# Patient Record
Sex: FEMALE | Race: WHITE | NOT HISPANIC OR LATINO | Employment: FULL TIME | URBAN - METROPOLITAN AREA
[De-identification: names, ages, dates, MRNs, and addresses within clinical notes are randomized per-mention and may not be internally consistent; named-entity substitution may affect disease eponyms.]

---

## 2017-01-18 ENCOUNTER — COMMUNICATION - HEALTHEAST (OUTPATIENT)
Dept: FAMILY MEDICINE | Facility: CLINIC | Age: 22
End: 2017-01-18

## 2017-04-03 ENCOUNTER — COMMUNICATION - HEALTHEAST (OUTPATIENT)
Dept: FAMILY MEDICINE | Facility: CLINIC | Age: 22
End: 2017-04-03

## 2017-06-30 ENCOUNTER — OFFICE VISIT - HEALTHEAST (OUTPATIENT)
Dept: FAMILY MEDICINE | Facility: CLINIC | Age: 22
End: 2017-06-30

## 2017-06-30 DIAGNOSIS — Z72.51 UNPROTECTED SEX: ICD-10-CM

## 2017-06-30 DIAGNOSIS — J45.20 INTERMITTENT ASTHMA: ICD-10-CM

## 2017-06-30 DIAGNOSIS — Z11.3 SCREEN FOR STD (SEXUALLY TRANSMITTED DISEASE): ICD-10-CM

## 2017-06-30 LAB — HIV 1+2 AB+HIV1 P24 AG SERPL QL IA: NEGATIVE

## 2017-07-01 LAB — HBV SURFACE AG SERPL QL IA: NEGATIVE

## 2017-07-03 ENCOUNTER — COMMUNICATION - HEALTHEAST (OUTPATIENT)
Dept: FAMILY MEDICINE | Facility: CLINIC | Age: 22
End: 2017-07-03

## 2017-07-03 LAB — SYPHILIS RPR SCREEN - HISTORICAL: NORMAL

## 2017-07-25 ENCOUNTER — RECORDS - HEALTHEAST (OUTPATIENT)
Dept: ADMINISTRATIVE | Facility: OTHER | Age: 22
End: 2017-07-25

## 2017-08-15 ENCOUNTER — COMMUNICATION - HEALTHEAST (OUTPATIENT)
Dept: TELEHEALTH | Facility: CLINIC | Age: 22
End: 2017-08-15

## 2017-08-15 ENCOUNTER — OFFICE VISIT - HEALTHEAST (OUTPATIENT)
Dept: FAMILY MEDICINE | Facility: CLINIC | Age: 22
End: 2017-08-15

## 2017-08-15 DIAGNOSIS — N89.8 VAGINAL DISCHARGE: ICD-10-CM

## 2017-08-15 ASSESSMENT — MIFFLIN-ST. JEOR: SCORE: 1447.34

## 2017-09-06 ENCOUNTER — OFFICE VISIT - HEALTHEAST (OUTPATIENT)
Dept: FAMILY MEDICINE | Facility: CLINIC | Age: 22
End: 2017-09-06

## 2017-09-06 DIAGNOSIS — R39.9 SYMPTOMS INVOLVING URINARY SYSTEM: ICD-10-CM

## 2017-09-06 DIAGNOSIS — N89.8 VAGINAL DISCHARGE: ICD-10-CM

## 2017-09-06 DIAGNOSIS — B37.31 VAGINAL CANDIDIASIS: ICD-10-CM

## 2017-09-06 ASSESSMENT — MIFFLIN-ST. JEOR: SCORE: 1424.66

## 2017-09-07 ENCOUNTER — COMMUNICATION - HEALTHEAST (OUTPATIENT)
Dept: FAMILY MEDICINE | Facility: CLINIC | Age: 22
End: 2017-09-07

## 2017-10-25 ENCOUNTER — COMMUNICATION - HEALTHEAST (OUTPATIENT)
Dept: FAMILY MEDICINE | Facility: CLINIC | Age: 22
End: 2017-10-25

## 2017-11-01 ENCOUNTER — OFFICE VISIT - HEALTHEAST (OUTPATIENT)
Dept: FAMILY MEDICINE | Facility: CLINIC | Age: 22
End: 2017-11-01

## 2017-11-01 DIAGNOSIS — N76.1 CHRONIC VAGINITIS: ICD-10-CM

## 2017-11-01 ASSESSMENT — MIFFLIN-ST. JEOR: SCORE: 1421.48

## 2017-11-08 ENCOUNTER — COMMUNICATION - HEALTHEAST (OUTPATIENT)
Dept: FAMILY MEDICINE | Facility: CLINIC | Age: 22
End: 2017-11-08

## 2017-11-10 ENCOUNTER — COMMUNICATION - HEALTHEAST (OUTPATIENT)
Dept: FAMILY MEDICINE | Facility: CLINIC | Age: 22
End: 2017-11-10

## 2017-11-21 ENCOUNTER — OFFICE VISIT - HEALTHEAST (OUTPATIENT)
Dept: FAMILY MEDICINE | Facility: CLINIC | Age: 22
End: 2017-11-21

## 2017-11-21 DIAGNOSIS — N76.1 CHRONIC VAGINITIS: ICD-10-CM

## 2017-11-21 ASSESSMENT — MIFFLIN-ST. JEOR: SCORE: 1427.15

## 2017-11-24 ENCOUNTER — COMMUNICATION - HEALTHEAST (OUTPATIENT)
Dept: FAMILY MEDICINE | Facility: CLINIC | Age: 22
End: 2017-11-24

## 2017-11-30 ENCOUNTER — RECORDS - HEALTHEAST (OUTPATIENT)
Dept: ADMINISTRATIVE | Facility: OTHER | Age: 22
End: 2017-11-30

## 2017-12-04 ENCOUNTER — RECORDS - HEALTHEAST (OUTPATIENT)
Dept: ADMINISTRATIVE | Facility: OTHER | Age: 22
End: 2017-12-04

## 2019-01-11 ENCOUNTER — COMMUNICATION - HEALTHEAST (OUTPATIENT)
Dept: FAMILY MEDICINE | Facility: CLINIC | Age: 24
End: 2019-01-11

## 2019-01-11 ENCOUNTER — OFFICE VISIT - HEALTHEAST (OUTPATIENT)
Dept: FAMILY MEDICINE | Facility: CLINIC | Age: 24
End: 2019-01-11

## 2019-01-11 ENCOUNTER — COMMUNICATION - HEALTHEAST (OUTPATIENT)
Dept: TELEHEALTH | Facility: CLINIC | Age: 24
End: 2019-01-11

## 2019-01-11 DIAGNOSIS — J45.20 MILD INTERMITTENT ASTHMA WITHOUT COMPLICATION: ICD-10-CM

## 2019-01-11 DIAGNOSIS — N89.8 VAGINAL DISCHARGE: ICD-10-CM

## 2019-01-11 DIAGNOSIS — F41.9 ANXIETY: ICD-10-CM

## 2019-01-11 DIAGNOSIS — R53.82 CHRONIC FATIGUE: ICD-10-CM

## 2019-01-11 DIAGNOSIS — Z30.41 ENCOUNTER FOR SURVEILLANCE OF CONTRACEPTIVE PILLS: ICD-10-CM

## 2019-01-11 DIAGNOSIS — Z00.00 HEALTH CARE MAINTENANCE: ICD-10-CM

## 2019-01-11 DIAGNOSIS — Z11.3 SCREEN FOR STD (SEXUALLY TRANSMITTED DISEASE): ICD-10-CM

## 2019-01-11 LAB
ALBUMIN SERPL-MCNC: 4 G/DL (ref 3.5–5)
ALP SERPL-CCNC: 50 U/L (ref 45–120)
ALT SERPL W P-5'-P-CCNC: 22 U/L (ref 0–45)
ANION GAP SERPL CALCULATED.3IONS-SCNC: 9 MMOL/L (ref 5–18)
AST SERPL W P-5'-P-CCNC: 27 U/L (ref 0–40)
BILIRUB SERPL-MCNC: 0.5 MG/DL (ref 0–1)
BUN SERPL-MCNC: 15 MG/DL (ref 8–22)
CALCIUM SERPL-MCNC: 9.5 MG/DL (ref 8.5–10.5)
CHLORIDE BLD-SCNC: 106 MMOL/L (ref 98–107)
CHOLEST SERPL-MCNC: 147 MG/DL
CLUE CELLS: NORMAL
CO2 SERPL-SCNC: 26 MMOL/L (ref 22–31)
CREAT SERPL-MCNC: 0.85 MG/DL (ref 0.6–1.1)
ERYTHROCYTE [DISTWIDTH] IN BLOOD BY AUTOMATED COUNT: 14.2 % (ref 11–14.5)
FASTING STATUS PATIENT QL REPORTED: NO
FERRITIN SERPL-MCNC: 6 NG/ML (ref 10–130)
GFR SERPL CREATININE-BSD FRML MDRD: >60 ML/MIN/1.73M2
GLUCOSE BLD-MCNC: 74 MG/DL (ref 70–125)
HCT VFR BLD AUTO: 37.8 % (ref 35–47)
HDLC SERPL-MCNC: 59 MG/DL
HGB BLD-MCNC: 12.8 G/DL (ref 12–16)
HIV 1+2 AB+HIV1 P24 AG SERPL QL IA: NEGATIVE
IRON SATN MFR SERPL: 18 % (ref 20–50)
IRON SERPL-MCNC: 96 UG/DL (ref 42–175)
LDLC SERPL CALC-MCNC: 77 MG/DL
MCH RBC QN AUTO: 29.2 PG (ref 27–34)
MCHC RBC AUTO-ENTMCNC: 33.8 G/DL (ref 32–36)
MCV RBC AUTO: 86 FL (ref 80–100)
PLATELET # BLD AUTO: 219 THOU/UL (ref 140–440)
PMV BLD AUTO: 7.4 FL (ref 7–10)
POTASSIUM BLD-SCNC: 4.1 MMOL/L (ref 3.5–5)
PROT SERPL-MCNC: 7 G/DL (ref 6–8)
RBC # BLD AUTO: 4.38 MILL/UL (ref 3.8–5.4)
SODIUM SERPL-SCNC: 141 MMOL/L (ref 136–145)
T4 FREE SERPL-MCNC: 1 NG/DL (ref 0.7–1.8)
TIBC SERPL-MCNC: 526 UG/DL (ref 313–563)
TRANSFERRIN SERPL-MCNC: 421 MG/DL (ref 212–360)
TRICHOMONAS, WET PREP: NORMAL
TRIGL SERPL-MCNC: 56 MG/DL
TSH SERPL DL<=0.005 MIU/L-ACNC: 1.05 UIU/ML (ref 0.3–5)
VIT B12 SERPL-MCNC: 637 PG/ML (ref 213–816)
WBC: 4.6 THOU/UL (ref 4–11)
YEAST, WET PREP: NORMAL

## 2019-01-11 ASSESSMENT — MIFFLIN-ST. JEOR: SCORE: 1397.27

## 2019-01-12 LAB
HBV SURFACE AG SERPL QL IA: NEGATIVE
T PALLIDUM AB SER QL: NEGATIVE

## 2019-01-14 LAB
25(OH)D3 SERPL-MCNC: 30.8 NG/ML (ref 30–80)
25(OH)D3 SERPL-MCNC: 30.8 NG/ML (ref 30–80)
C TRACH DNA SPEC QL PROBE+SIG AMP: NEGATIVE
N GONORRHOEA DNA SPEC QL NAA+PROBE: NEGATIVE

## 2019-02-03 ENCOUNTER — RECORDS - HEALTHEAST (OUTPATIENT)
Dept: ADMINISTRATIVE | Facility: OTHER | Age: 24
End: 2019-02-03

## 2019-03-19 ENCOUNTER — COMMUNICATION - HEALTHEAST (OUTPATIENT)
Dept: FAMILY MEDICINE | Facility: CLINIC | Age: 24
End: 2019-03-19

## 2019-03-19 DIAGNOSIS — F32.A DEPRESSION: ICD-10-CM

## 2020-01-13 ENCOUNTER — COMMUNICATION - HEALTHEAST (OUTPATIENT)
Dept: FAMILY MEDICINE | Facility: CLINIC | Age: 25
End: 2020-01-13

## 2020-01-13 DIAGNOSIS — Z30.41 ENCOUNTER FOR SURVEILLANCE OF CONTRACEPTIVE PILLS: ICD-10-CM

## 2020-07-24 ENCOUNTER — RECORDS - HEALTHEAST (OUTPATIENT)
Dept: ADMINISTRATIVE | Facility: OTHER | Age: 25
End: 2020-07-24

## 2020-08-04 ENCOUNTER — COMMUNICATION - HEALTHEAST (OUTPATIENT)
Dept: FAMILY MEDICINE | Facility: CLINIC | Age: 25
End: 2020-08-04

## 2020-08-04 DIAGNOSIS — Z30.41 ENCOUNTER FOR SURVEILLANCE OF CONTRACEPTIVE PILLS: ICD-10-CM

## 2020-09-08 ENCOUNTER — RECORDS - HEALTHEAST (OUTPATIENT)
Dept: ADMINISTRATIVE | Facility: OTHER | Age: 25
End: 2020-09-08

## 2020-10-19 ENCOUNTER — COMMUNICATION - HEALTHEAST (OUTPATIENT)
Dept: FAMILY MEDICINE | Facility: CLINIC | Age: 25
End: 2020-10-19

## 2020-10-19 DIAGNOSIS — Z30.41 ENCOUNTER FOR SURVEILLANCE OF CONTRACEPTIVE PILLS: ICD-10-CM

## 2020-10-21 ENCOUNTER — OFFICE VISIT - HEALTHEAST (OUTPATIENT)
Dept: FAMILY MEDICINE | Facility: CLINIC | Age: 25
End: 2020-10-21

## 2020-10-21 DIAGNOSIS — J20.9 ACUTE BRONCHITIS, UNSPECIFIED ORGANISM: ICD-10-CM

## 2020-10-21 DIAGNOSIS — R06.2 WHEEZING: ICD-10-CM

## 2020-10-21 DIAGNOSIS — R05.9 COUGH: ICD-10-CM

## 2020-10-21 RX ORDER — ALBUTEROL SULFATE 90 UG/1
2 AEROSOL, METERED RESPIRATORY (INHALATION) EVERY 4 HOURS PRN
Qty: 1 INHALER | Refills: 3 | Status: SHIPPED | OUTPATIENT
Start: 2020-10-21 | End: 2022-10-23

## 2020-11-13 ENCOUNTER — COMMUNICATION - HEALTHEAST (OUTPATIENT)
Dept: FAMILY MEDICINE | Facility: CLINIC | Age: 25
End: 2020-11-13

## 2020-11-13 DIAGNOSIS — Z30.41 ENCOUNTER FOR SURVEILLANCE OF CONTRACEPTIVE PILLS: ICD-10-CM

## 2020-11-16 RX ORDER — NORETHINDRONE ACETATE AND ETHINYL ESTRADIOL .02; 1 MG/1; MG/1
TABLET ORAL
Qty: 84 TABLET | Refills: 3 | Status: SHIPPED | OUTPATIENT
Start: 2020-11-16 | End: 2022-02-16

## 2021-03-04 ENCOUNTER — AMBULATORY - HEALTHEAST (OUTPATIENT)
Dept: FAMILY MEDICINE | Facility: CLINIC | Age: 26
End: 2021-03-04

## 2021-03-04 ENCOUNTER — OFFICE VISIT - HEALTHEAST (OUTPATIENT)
Dept: FAMILY MEDICINE | Facility: CLINIC | Age: 26
End: 2021-03-04

## 2021-03-04 DIAGNOSIS — N39.0 URINARY TRACT INFECTION: ICD-10-CM

## 2021-03-04 DIAGNOSIS — N76.0 VAGINOSIS: ICD-10-CM

## 2021-03-04 DIAGNOSIS — J45.20 MILD INTERMITTENT ASTHMA WITHOUT COMPLICATION: ICD-10-CM

## 2021-03-04 DIAGNOSIS — Z00.00 ROUTINE GENERAL MEDICAL EXAMINATION AT A HEALTH CARE FACILITY: ICD-10-CM

## 2021-03-04 LAB
ALBUMIN UR-MCNC: NEGATIVE MG/DL
APPEARANCE UR: CLEAR
BACTERIA #/AREA URNS HPF: ABNORMAL HPF
BILIRUB UR QL STRIP: NEGATIVE
CHOLEST SERPL-MCNC: 179 MG/DL
CLUE CELLS: ABNORMAL
COLOR UR AUTO: YELLOW
FASTING STATUS PATIENT QL REPORTED: NO
FASTING STATUS PATIENT QL REPORTED: NO
GLUCOSE BLD-MCNC: 82 MG/DL (ref 70–125)
GLUCOSE UR STRIP-MCNC: NEGATIVE MG/DL
HDLC SERPL-MCNC: 64 MG/DL
HGB BLD-MCNC: 13.1 G/DL (ref 12–16)
HGB UR QL STRIP: NEGATIVE
KETONES UR STRIP-MCNC: NEGATIVE MG/DL
LDLC SERPL CALC-MCNC: 105 MG/DL
LEUKOCYTE ESTERASE UR QL STRIP: ABNORMAL
NITRATE UR QL: NEGATIVE
PH UR STRIP: 7 [PH] (ref 5–8)
RBC #/AREA URNS AUTO: ABNORMAL HPF
SP GR UR STRIP: 1.01 (ref 1–1.03)
SQUAMOUS #/AREA URNS AUTO: ABNORMAL LPF
TRICHOMONAS, WET PREP: ABNORMAL
TRIGL SERPL-MCNC: 49 MG/DL
UROBILINOGEN UR STRIP-ACNC: ABNORMAL
WBC #/AREA URNS AUTO: ABNORMAL HPF
YEAST, WET PREP: ABNORMAL

## 2021-03-04 ASSESSMENT — MIFFLIN-ST. JEOR: SCORE: 1467.21

## 2021-03-05 LAB — BACTERIA SPEC CULT: NO GROWTH

## 2021-05-26 VITALS
HEART RATE: 90 BPM | DIASTOLIC BLOOD PRESSURE: 72 MMHG | OXYGEN SATURATION: 97 % | TEMPERATURE: 98.8 F | SYSTOLIC BLOOD PRESSURE: 118 MMHG

## 2021-05-28 ASSESSMENT — ASTHMA QUESTIONNAIRES: ACT_TOTALSCORE: 23

## 2021-05-29 ENCOUNTER — RECORDS - HEALTHEAST (OUTPATIENT)
Dept: ADMINISTRATIVE | Facility: CLINIC | Age: 26
End: 2021-05-29

## 2021-05-31 VITALS — WEIGHT: 143.1 LBS | BODY MASS INDEX: 21.69 KG/M2 | HEIGHT: 68 IN

## 2021-05-31 VITALS — BODY MASS INDEX: 21.58 KG/M2 | WEIGHT: 142.4 LBS | HEIGHT: 68 IN

## 2021-05-31 VITALS — WEIGHT: 141.9 LBS | BODY MASS INDEX: 21.5 KG/M2 | HEIGHT: 68 IN

## 2021-05-31 VITALS — HEIGHT: 68 IN | BODY MASS INDEX: 22.45 KG/M2 | WEIGHT: 148.1 LBS

## 2021-05-31 VITALS — BODY MASS INDEX: 22.9 KG/M2 | WEIGHT: 148.38 LBS

## 2021-06-02 VITALS — BODY MASS INDEX: 20.77 KG/M2 | WEIGHT: 137.06 LBS | HEIGHT: 68 IN

## 2021-06-03 ENCOUNTER — RECORDS - HEALTHEAST (OUTPATIENT)
Dept: ADMINISTRATIVE | Facility: CLINIC | Age: 26
End: 2021-06-03

## 2021-06-05 VITALS
WEIGHT: 153.1 LBS | SYSTOLIC BLOOD PRESSURE: 126 MMHG | DIASTOLIC BLOOD PRESSURE: 72 MMHG | HEART RATE: 90 BPM | BODY MASS INDEX: 24.03 KG/M2 | HEIGHT: 67 IN

## 2021-06-05 NOTE — TELEPHONE ENCOUNTER
Refill Given    Refill given per Policy, patient informed they are overdue for Labs and Physical     Anna Whelan, [facility] Connection Triage/Med Refill 1/14/2020    Requested Prescriptions   Pending Prescriptions Disp Refills     norethindrone-ethinyl estradiol (JUNEL 1/20, 21,) 1-20 mg-mcg per tablet [Pharmacy Med Name: JUNEL 1 MG-20 MCG TABLET] 84 tablet 3     Sig: TAKE 1 TABLET BY MOUTH EVERY DAY       Oral Contraceptives Protocol Failed - 1/13/2020  2:15 AM        Failed - Visit with PCP or prescribing provider visit in last 12 months      Last office visit with prescriber/PCP: 9/6/2017 Leigh Ann Monterroso MD OR same dept: Visit date not found OR same specialty: 11/21/2017 Gissel Desouza DO  Last physical: 1/11/2019 Last MTM visit: Visit date not found   Next visit within 3 mo: Visit date not found  Next physical within 3 mo: Visit date not found  Prescriber OR PCP: Leigh Ann Monterroso MD  Last diagnosis associated with med order: 1. Encounter for surveillance of contraceptive pills  - JUNEL 1/20, 21, 1-20 mg-mcg per tablet [Pharmacy Med Name: JUNEL 1 MG-20 MCG TABLET]; TAKE 1 TABLET BY MOUTH EVERY DAY  Dispense: 84 tablet; Refill: 3    If protocol passes may refill for 12 months if within 3 months of last provider visit (or a total of 15 months).

## 2021-06-08 ENCOUNTER — RECORDS - HEALTHEAST (OUTPATIENT)
Dept: ADMINISTRATIVE | Facility: OTHER | Age: 26
End: 2021-06-08

## 2021-06-10 NOTE — TELEPHONE ENCOUNTER
RN cannot approve Refill Request    RN can NOT refill this medication Protocol failed and NO refill given. Last office visit: 9/6/2017 Leigh Ann Monterroso MD Last Physical: 1/11/2019 Last MTM visit: Visit date not found Last visit same specialty: 11/21/2017 Gissel Desouza DO.  Next visit within 3 mo: Visit date not found  Next physical within 3 mo: Visit date not found      Neeta Bland, [facility] [place] Connection Triage/Med Refill 8/4/2020    Requested Prescriptions   Pending Prescriptions Disp Refills     norethindrone-ethinyl estradiol (JUNEL 1/20, 21,) 1-20 mg-mcg per tablet 84 tablet 0     Sig: TAKE 1 TABLET BY MOUTH EVERY DAY       Oral Contraceptives Protocol Failed - 8/4/2020  9:11 AM        Failed - Visit with PCP or prescribing provider visit in last 12 months      Last office visit with prescriber/PCP: 9/6/2017 Leigh Ann Monterroso MD OR same dept: Visit date not found OR same specialty: 11/21/2017 Gissel Desouza DO  Last physical: 1/11/2019 Last MTM visit: Visit date not found   Next visit within 3 mo: Visit date not found  Next physical within 3 mo: Visit date not found  Prescriber OR PCP: Leigh Ann Monterroso MD  Last diagnosis associated with med order: 1. Encounter for surveillance of contraceptive pills  - norethindrone-ethinyl estradiol (JUNEL 1/20, 21,) 1-20 mg-mcg per tablet; TAKE 1 TABLET BY MOUTH EVERY DAY  Dispense: 84 tablet; Refill: 0    If protocol passes may refill for 12 months if within 3 months of last provider visit (or a total of 15 months).

## 2021-06-10 NOTE — TELEPHONE ENCOUNTER
Refill Request  Did you contact pharmacy: Yes  Patient was told to call provider.  Totally out of tablets. Patient had a physical in January. Please review.       Medication name:   Requested Prescriptions     Pending Prescriptions Disp Refills     norethindrone-ethinyl estradiol (JUNEL 1/20, 21,) 1-20 mg-mcg per tablet 84 tablet 0     Sig: TAKE 1 TABLET BY MOUTH EVERY DAY     Who prescribed the medication: Dr Monterroso  Requested Pharmacy: [facility] [id number] Please note change.   Is patient out of medication: Yes  Patient notified refills processed in 3 business days:  no  Okay to leave a detailed message: no

## 2021-06-11 NOTE — PROGRESS NOTES
Assessment/Plan:     1. Screen for STD (sexually transmitted disease)  Chlamydia trachomatis & Neisseria gonorrhoeae, Amplified Detection    HIV Antigen/Antibody Screening [place]    Syphilis Screen, [place]    Hepatitis B Surface Antigen (HBsAG)   2. Unprotected sex  Pregnancy (Beta-hCG, Qual), Urine   3. Intermittent asthma         Diagnoses and all orders for this visit:    Screen for STD (sexually transmitted disease)  -     Chlamydia trachomatis & Neisseria gonorrhoeae, Amplified Detection  -     HIV Antigen/Antibody Screening [place]  -     Syphilis Screen, [place]  -     Hepatitis B Surface Antigen (HBsAG)    Unprotected sex  -     Pregnancy (Beta-hCG, Qual), Urine  -Counseled on safe sex practices and use of condoms    Intermittent asthma  -Stable.  Continue albuterol as needed and before exercise.  Asthma action plan completed.    Other orders  -     albuterol (PROAIR HFA) 90 mcg/actuation inhaler; Inhale 2 puffs every 4 (four) hours as needed for wheezing. 15 minutes before exercise and/or every 4 hours as needed.  Dispense: 1 Inhaler; Refill: 3                 Subjective:      Chelo Faulkner is a 22 y.o. female who comes in today for STD check.  She recently had unprotected intercourse.  She has not been exposed to any known sexually transmitted infections but would like to be proactive and be tested.  She is not experiencing any concerning symptoms.  Denies any abnormal vaginal discharge or abnormal bleeding.  No pelvic pain or pain with intercourse.  She is using birth control pills for contraception.  Last menstrual period started 6/12/2017.  She has intermittent asthma and requests refill of albuterol inhaler.  Uses this before exercise.  Overall feels her asthma is under good control.  Reviewed allergies and medications.  No other concerns or questions today.  Remainder of review of systems negative.    Current Outpatient Prescriptions   Medication Sig Dispense Refill     albuterol (PROAIR HFA)  90 mcg/actuation inhaler Inhale 2 puffs every 4 (four) hours as needed for wheezing. 15 minutes before exercise and/or every 4 hours as needed. 1 Inhaler 3     norethindrone-ethinyl estradiol (JUNEL 1/20, 21,) 1-20 mg-mcg per tablet Take 1 tablet by mouth daily. 84 tablet 2     desonide (DESOWEN) 0.05 % ointment Apply sparingly to affected area(s) twice daily.       No current facility-administered medications for this visit.        Past Medical History, Family History, and Social History reviewed.  Past Medical History:   Diagnosis Date     Scoliosis      No past surgical history on file.  Review of patient's allergies indicates no known allergies.  Family History   Problem Relation Age of Onset     No Medical Problems Mother      Stroke Father      No Medical Problems Brother      Social History     Social History     Marital status: Single     Spouse name: N/A     Number of children: N/A     Years of education: N/A     Occupational History     Not on file.     Social History Main Topics     Smoking status: Never Smoker     Smokeless tobacco: Not on file     Alcohol use No      Comment: occ social drink     Drug use: No     Sexual activity: No     Other Topics Concern     Not on file     Social History Narrative         Review of systems is as stated in HPI, and the remainder of the 10 system review is otherwise negative.    Objective:     Vitals:    06/30/17 0935   BP: 110/62   Patient Site: Right Arm   Patient Position: Sitting   Cuff Size: Adult Regular   Pulse: 72   Temp: 98.6  F (37  C)   TempSrc: Tympanic   SpO2: 100%   Weight: 148 lb 6 oz (67.3 kg)    Body mass index is 22.9 kg/(m^2).    General appearance: alert, appears stated age and cooperative  Pelvic: cervix normal in appearance, external genitalia normal and vagina normal without discharge      ACT: 25  Recent Results (from the past 240 hour(s))   Pregnancy (Beta-hCG, Qual), Urine   Result Value Ref Range    Pregnancy Test, Urine Negative Negative     Specific Gravity, UA 1.015 1.001 - 1.030       This note has been dictated using voice recognition software. Any grammatical or context distortions are unintentional and inherent to the the software.

## 2021-06-12 NOTE — TELEPHONE ENCOUNTER
Refill sent today. Pt due for office visit before any further refills can be given. Please call to schedule physical

## 2021-06-12 NOTE — PROGRESS NOTES
Assessment/Plan:     1. Vaginal candidiasis     2. Symptoms involving urinary system  Urinalysis-UC if Indicated    Culture, Urine   3. Vaginal discharge  Wet Prep, Vaginal    Chlamydia trachomatis & Neisseria gonorrhoeae, Amplified Detection       Diagnoses and all orders for this visit:    Vaginal candidiasis  -Wet prep shows presence of yeast.  We will treat with fluconazole 150 mg once and repeat in 3-5 days if needed.  Counseled on use of medication and side effects.  Follow-up if symptoms worsening or not improving with treatment.    Symptoms involving urinary system  -     Urinalysis-UC if Indicated  -     Culture, Urine  -Urinalysis is slightly abnormal but looks to be contaminated with presence of multiple squamous cells.  Not having typical UTI symptoms.  Will send urine for culture.  Will treat if culture is positive.    Vaginal discharge  -     Wet Prep, Vaginal  -     Chlamydia trachomatis & Neisseria gonorrhoeae, Amplified Detection  -Wet prep positive for yeast.  Will treat with fluconazole.    Other orders  -     fluconazole (DIFLUCAN) 150 MG tablet; Take 1 tablet (150 mg total) by mouth once for 1 dose. Repeat in 3-5 days if needed  Dispense: 2 tablet; Refill: 0             Subjective:      Chelo Faulkner is a 22 y.o. female who comes in today with concern about a vaginal discharge.  She was seen on August 15 for the same concern.  At that time a wet prep was negative.  Gonorrhea and chlamydia swab was not collected.  She was advised on some to Medicare's and advised follow-up with her primary care provider if symptoms persisted.  She reports that she did develop some vaginal itching a few days after that visit and then she had her menstrual period.  After her period ended she had recurrence of symptoms.  Having increased vaginal discharge that is thicker than usual.  Also slightly yellowish in color with a slight odor.  Has had vaginal itching and some pain with penetration during intercourse.   She has not had dysuria.  Will occasionally have sudden urgency to use the bathroom with little urine output.  Otherwise no regular symptoms of urinary frequency or urgency.  She has not had any new sexual partners.  No exposure to sexually transmitted infections that she is aware of.  She has no other concerns today.  Review of systems otherwise negative.  She reports that she did take an antibiotic in early August for treatment of a sinus infection.  Allergies medications are reviewed and updated in the chart.    Current Outpatient Prescriptions   Medication Sig Dispense Refill     albuterol (PROAIR HFA) 90 mcg/actuation inhaler Inhale 2 puffs every 4 (four) hours as needed for wheezing. 15 minutes before exercise and/or every 4 hours as needed. 1 Inhaler 3     desonide (DESOWEN) 0.05 % ointment Apply sparingly to affected area(s) twice daily.       norethindrone-ethinyl estradiol (JUNEL 1/20, 21,) 1-20 mg-mcg per tablet Take 1 tablet by mouth daily. 84 tablet 2     fluconazole (DIFLUCAN) 150 MG tablet Take 1 tablet (150 mg total) by mouth once for 1 dose. Repeat in 3-5 days if needed 2 tablet 0     No current facility-administered medications for this visit.        Past Medical History, Family History, and Social History reviewed.  Past Medical History:   Diagnosis Date     Scoliosis      No past surgical history on file.  Review of patient's allergies indicates no known allergies.  Family History   Problem Relation Age of Onset     No Medical Problems Mother      Stroke Father      No Medical Problems Brother      Social History     Social History     Marital status: Single     Spouse name: N/A     Number of children: N/A     Years of education: N/A     Occupational History     Not on file.     Social History Main Topics     Smoking status: Never Smoker     Smokeless tobacco: Not on file     Alcohol use No      Comment: occ social drink     Drug use: No     Sexual activity: No     Other Topics Concern     Not  "on file     Social History Narrative         Review of systems is as stated in HPI, and the remainder of the 10 system review is otherwise negative.    Objective:     Vitals:    09/06/17 0840   BP: 110/60   Patient Site: Right Arm   Patient Position: Sitting   Cuff Size: Adult Regular   Pulse: 62   Resp: 20   Temp: 98.3  F (36.8  C)   TempSrc: Oral   Weight: 143 lb 1.6 oz (64.9 kg)   Height: 5' 7.5\" (1.715 m)    Body mass index is 22.08 kg/(m^2).    General appearance: alert, appears stated age and cooperative  Pelvic: cervix normal in appearance, external genitalia normal, rectovaginal septum normal and moderate amount of thick white vaginal discharge present  Neurologic: Grossly normal    Recent Results (from the past 168 hour(s))   Urinalysis-UC if Indicated   Result Value Ref Range    Color, UA Yellow Colorless, Yellow, Straw, Light Yellow    Clarity, UA Cloudy (!) Clear    Glucose, UA Negative Negative    Bilirubin, UA Negative Negative    Ketones, UA Negative Negative    Specific Gravity, UA 1.020 1.005 - 1.030    Blood, UA Trace (!) Negative    pH, UA 6.5 5.0 - 8.0    Protein, UA Trace (!) Negative mg/dL    Urobilinogen, UA 0.2 E.U./dL 0.2 E.U./dL, 1.0 E.U./dL    Nitrite, UA Negative Negative    Leukocytes, UA Moderate (!) Negative    Bacteria, UA Many (!) None Seen hpf    RBC, UA 0-2 None Seen, 0-2 hpf    WBC, UA 5-10 (!) None Seen, 0-5 hpf    Squam Epithel, UA 10-25 (!) None Seen, 0-5 lpf    Mucus, UA Few (!) None Seen lpf   Wet Prep, Vaginal   Result Value Ref Range    Yeast Result Yeast Seen (!) No yeast seen    Trichomonas No Trichomonas seen No Trichomonas seen    Clue Cells, Wet Prep No Clue cells seen No Clue cells seen       This note has been dictated using voice recognition software. Any grammatical or context distortions are unintentional and inherent to the the software.       "

## 2021-06-12 NOTE — PROGRESS NOTES
Assessment/Plan:     1. Acute bronchitis, unspecified organism  azithromycin (ZITHROMAX) 250 MG tablet    benzonatate (TESSALON PERLES) 100 MG capsule   2. Cough  XR Chest 2 Views   3. Wheezing  XR Chest 2 Views    albuterol (PROAIR HFA) 90 mcg/actuation inhaler       Diagnoses and all orders for this visit:    Acute bronchitis, unspecified organism  -     azithromycin (ZITHROMAX) 250 MG tablet; Take 2 tabs on day 1 and then 1 tab daily for next 4 days  Dispense: 6 tablet; Refill: 0  -     benzonatate (TESSALON PERLES) 100 MG capsule; Take 1 capsule (100 mg total) by mouth 3 (three) times a day as needed for cough.  Dispense: 30 capsule; Refill: 0    Cough  -     XR Chest 2 Views    Wheezing  -     XR Chest 2 Views  -     albuterol (PROAIR HFA) 90 mcg/actuation inhaler; Inhale 2 puffs every 4 (four) hours as needed for wheezing. 15 minutes before exercise and/or every 4 hours as needed.  Dispense: 1 Inhaler; Refill: 3       Chest x-ray is performed in clinic and is negative.  Lung examination today is normal.  Symptoms consistent with acute bronchitis.  Given duration of cough, recommend treatment with a azithromycin.  Prescription sent to pharmacy.  Counseled her on use of medication side effects.  Okay to use albuterol inhaler as needed.  Also provided prescription for benzonatate capsules to use for cough.  Encourage adequate rest and fluids.  Follow-up if symptoms worsening, do not improve or if new concerning symptoms develop.      Subjective:      Chelo Faulkner is a 25 y.o. female who comes in today with concern about cough and wheezing.  She reports that on Labor Day weekend she was seen at the [facility] for a sinus infection.  Covid test was performed and it was positive.  She was treated with amoxicillin for the sinus infection.  Symptoms resolved and she felt better.  Then earlier this month she was seen at the [facility] again for sore throat and swollen glands in her neck.  Rapid strep was  negative.  She was advised on symptomatic cares.  She reports that the throat pain has resolved.  However she is now bothered by a lingering cough that has been present for the last several weeks.  Cough is associated with congestion in her chest.  Cough is productive of mucus and phlegm.  Sometimes mucus is greenish in color.  She is otherwise feeling fine.  She has not had fevers or chills.  No sinus pain or pressure.  No headaches or body aches.  No significant shortness of breath.  Does have occasional wheezing.  She has tried over-the-counter cough and cold medication without significant relief.  She has not been using her albuterol inhaler because she is not sure if it is okay to do so.  Medications and allergies are reviewed and updated.  No other concerns or questions.    Current Outpatient Medications   Medication Sig Dispense Refill     albuterol (PROAIR HFA) 90 mcg/actuation inhaler Inhale 2 puffs every 4 (four) hours as needed for wheezing. 15 minutes before exercise and/or every 4 hours as needed. 1 Inhaler 3     norethindrone-ethinyl estradiol (JUNEL 1/20, 21,) 1-20 mg-mcg per tablet TAKE 1 TABLET BY MOUTH EVERY DAY 84 tablet 0     azithromycin (ZITHROMAX) 250 MG tablet Take 2 tabs on day 1 and then 1 tab daily for next 4 days 6 tablet 0     benzonatate (TESSALON PERLES) 100 MG capsule Take 1 capsule (100 mg total) by mouth 3 (three) times a day as needed for cough. 30 capsule 0     No current facility-administered medications for this visit.        Past Medical History, Family History, and Social History reviewed.  Past Medical History:   Diagnosis Date     Scoliosis      No past surgical history on file.  Patient has no known allergies.  Family History   Problem Relation Age of Onset     No Medical Problems Mother      Stroke Father      No Medical Problems Brother      Social History     Socioeconomic History     Marital status: Single     Spouse name: Not on file     Number of children: Not on file      Years of education: Not on file     Highest education level: Not on file   Occupational History     Not on file   Social Needs     Financial resource strain: Not on file     Food insecurity     Worry: Not on file     Inability: Not on file     Transportation needs     Medical: Not on file     Non-medical: Not on file   Tobacco Use     Smoking status: Never Smoker     Smokeless tobacco: Never Used   Substance and Sexual Activity     Alcohol use: No     Comment: occ social drink     Drug use: No     Sexual activity: Never     Birth control/protection: Pill   Lifestyle     Physical activity     Days per week: Not on file     Minutes per session: Not on file     Stress: Not on file   Relationships     Social connections     Talks on phone: Not on file     Gets together: Not on file     Attends Mosque service: Not on file     Active member of club or organization: Not on file     Attends meetings of clubs or organizations: Not on file     Relationship status: Not on file     Intimate partner violence     Fear of current or ex partner: Not on file     Emotionally abused: Not on file     Physically abused: Not on file     Forced sexual activity: Not on file   Other Topics Concern     Not on file   Social History Narrative     Not on file         Review of systems is as stated in HPI, and the remainder of the 10 system review is otherwise negative.    Objective:     There were no vitals filed for this visit. There is no height or weight on file to calculate BMI.    General appearance: alert, appears stated age and cooperative  Head: Normocephalic, without obvious abnormality, atraumatic  Eyes: negative  Ears: normal TM's and external ear canals both ears  Nose: Nares normal. Septum midline. Mucosa normal. No drainage or sinus tenderness.  Throat: lips, mucosa, and tongue normal; teeth and gums normal  Lungs: clear to auscultation bilaterally  Heart: regular rate and rhythm, S1, S2 normal, no murmur, click, rub or  gallop    Results: Chest x-ray was performed today in clinic.  This was personally reviewed.  Per my read it was negative.      This note has been dictated using voice recognition software. Any grammatical or context distortions are unintentional and inherent to the the software.

## 2021-06-12 NOTE — TELEPHONE ENCOUNTER
RN cannot approve Refill Request    RN can NOT refill this medication PCP messaged that patient is overdue for Office Visit and Protocol failed and NO refill given. Last office visit: 9/6/2017 Leigh Ann Monterroso MD Last Physical: 1/11/2019 Last MTM visit: Visit date not found Last visit same specialty: 11/21/2017 Gissel Desouza DO.  Next visit within 3 mo: Visit date not found  Next physical within 3 mo: Visit date not found      Ana Hyde, [facility] Triage/Med Refill 10/20/2020    Requested Prescriptions   Pending Prescriptions Disp Refills     norethindrone-ethinyl estradiol (JUNEL 1/20, 21,) 1-20 mg-mcg per tablet [Pharmacy Med Name: JUNEL 1 MG-20 MCG TABLET] 84 tablet 0     Sig: TAKE 1 TABLET BY MOUTH EVERY DAY       Oral Contraceptives Protocol Failed - 10/19/2020 12:29 AM        Failed - Visit with PCP or prescribing provider visit in last 12 months      Last office visit with prescriber/PCP: 9/6/2017 Leigh Ann Monterroso MD OR same dept: Visit date not found OR same specialty: 11/21/2017 Gissel Desouza DO  Last physical: 1/11/2019 Last MTM visit: Visit date not found   Next visit within 3 mo: Visit date not found  Next physical within 3 mo: Visit date not found  Prescriber OR PCP: Leigh Ann Monterroso MD  Last diagnosis associated with med order: 1. Encounter for surveillance of contraceptive pills  - JUNEL 1/20, 21, 1-20 mg-mcg per tablet [Pharmacy Med Name: JUNEL 1 MG-20 MCG TABLET]; TAKE 1 TABLET BY MOUTH EVERY DAY  Dispense: 84 tablet; Refill: 0    If protocol passes may refill for 12 months if within 3 months of last provider visit (or a total of 15 months).

## 2021-06-12 NOTE — PROGRESS NOTES
Assessment:   1. Vaginal discharge  Normal drainage.  Wet prep was negative.  - Wet Prep, Vaginal     Plan:   Symptomatic local care discussed.  Follow up with PCP in 5 days if symptom persist or worsen    Subjective:   Chelo Faulkner is a 22 y.o. female who presents for evaluation of vaginal discharge yellow, green, thick and odorless. Symptoms have been present for 4 days. Symptoms include none. Menstrual pattern: bleeding regularly. Contraception: OCP (estrogen/progesterone)  STI Risk: Very low risk of STD exposure   The following portions of the patient's history were reviewed and updated as appropriate:   She  has a past medical history of Scoliosis.  She  does not have any pertinent problems on file.  She  has no past surgical history on file.  Her family history includes No Medical Problems in her brother and mother; Stroke in her father.  She  reports that she has never smoked. She does not have any smokeless tobacco history on file. She reports that she does not drink alcohol or use illicit drugs.  Current Outpatient Prescriptions   Medication Sig Dispense Refill     albuterol (PROAIR HFA) 90 mcg/actuation inhaler Inhale 2 puffs every 4 (four) hours as needed for wheezing. 15 minutes before exercise and/or every 4 hours as needed. 1 Inhaler 3     norethindrone-ethinyl estradiol (JUNEL 1/20, 21,) 1-20 mg-mcg per tablet Take 1 tablet by mouth daily. 84 tablet 2     desonide (DESOWEN) 0.05 % ointment Apply sparingly to affected area(s) twice daily.       No current facility-administered medications for this visit.      Current Outpatient Prescriptions on File Prior to Visit   Medication Sig     albuterol (PROAIR HFA) 90 mcg/actuation inhaler Inhale 2 puffs every 4 (four) hours as needed for wheezing. 15 minutes before exercise and/or every 4 hours as needed.     norethindrone-ethinyl estradiol (JUNEL 1/20, 21,) 1-20 mg-mcg per tablet Take 1 tablet by mouth daily.     desonide (DESOWEN) 0.05 % ointment Apply  "sparingly to affected area(s) twice daily.     No current facility-administered medications on file prior to visit.      She has No Known Allergies..    Review of Systems  A 12 point comprehensive review of systems was negative except as noted.      Objective:   /62  Pulse 61  Ht 5' 7.5\" (1.715 m)  Wt 148 lb 1.6 oz (67.2 kg)  LMP 07/30/2017  SpO2 96%  BMI 22.85 kg/m2  General appearance: alert, appears stated age and cooperative  Head: Normocephalic, without obvious abnormality, atraumatic  Pelvic: cervix normal in appearance, external genitalia normal, no adnexal masses or tenderness, no bladder tenderness, no cervical motion tenderness, positive findings: vaginal discharge:  white and thick and rectovaginal septum normal  Pulses: 2+ and symmetric  Skin: Skin color, texture, turgor normal. No rashes or lesions  Lymph nodes: Cervical, supraclavicular, and axillary nodes normal.  Neurologic: Grossly normal   "

## 2021-06-13 NOTE — TELEPHONE ENCOUNTER
Refill Approved    Rx renewed per Medication Renewal Policy. Medication was last renewed on 10/21/20.    Neeta Bland, [facility] Triage/Med Refill 11/16/2020     Requested Prescriptions   Pending Prescriptions Disp Refills     norethindrone-ethinyl estradiol (JUNEL 1/20, 21,) 1-20 mg-mcg per tablet 84 tablet 0     Sig: TAKE 1 TABLET BY MOUTH EVERY DAY       Oral Contraceptives Protocol Passed - 11/13/2020  9:06 AM        Passed - Visit with PCP or prescribing provider visit in last 12 months      Last office visit with prescriber/PCP: 10/21/2020 Leigh Ann Monterroso MD OR same dept: 10/21/2020 Leigh Ann Monterroso MD OR same specialty: 10/21/2020 Leigh Ann Monterroso MD  Last physical: 1/11/2019 Last MTM visit: Visit date not found   Next visit within 3 mo: Visit date not found  Next physical within 3 mo: Visit date not found  Prescriber OR PCP: Leigh Ann Monterroso MD  Last diagnosis associated with med order: 1. Encounter for surveillance of contraceptive pills  - norethindrone-ethinyl estradiol (JUNEL 1/20, 21,) 1-20 mg-mcg per tablet; TAKE 1 TABLET BY MOUTH EVERY DAY  Dispense: 84 tablet; Refill: 0    If protocol passes may refill for 12 months if within 3 months of last provider visit (or a total of 15 months).

## 2021-06-13 NOTE — PROGRESS NOTES
Assessment/Plan:     1. Contraception  Works well for patient medication refilled  - norethindrone-ethinyl estradiol (MICROGESTIN 1/20) 1-20 mg-mcg per tablet; TAKE 1 TABLET BY MOUTH DAILY.  Dispense: 84 tablet; Refill: 3    2. Chronic vaginitis  Wet prep was negative but it was reported from lab staff that there was a large amount of white blood cells which me more concerned for some type of infectious process possible group B strep versus just physiologic discharge.  Will send out a culture.  Per patient's request will give trial of treatment recommended using probiotics.  Color return to care if symptoms worsen or do not improve.  - Wet Prep, Vaginal  - Chlamydia trachomatis & Neisseria gonorrhoeae, Amplified Detection  - Culture, Genital  - ampicillin (PRINCIPEN) 500 MG capsule; Take 1 capsule (500 mg total) by mouth 3 (three) times a day for 3 days.  Dispense: 9 capsule; Refill: 0          Subjective:      Chelo Faulkner is a 22 y.o. female comes in today for concerns of vaginal discharge.  Patient states she has actually had discharge since early teenage years that has always been somewhat excessive.  States that sometimes it will wax and wane throughout her cycle.  She states that she has been seen numerous times for that she has numerous gonorrhea and Chlamydia checks which have been negative.  She has had numerous wet preps that are reported as negative.  Most recently in September she did have a positive wet prep for yeast.  She states that she finish the fluconazole and she did feel that symptoms had gotten better as far as the itching and thick white discharge but she states that her more chronic symptoms came back.  She does complain of a vaginal odor and a thick white or sometimes green vaginal discharge.  She is in a monogamous relationship does not believe she has concerns for STDs.  She does not use any lubricants.  They do not typically use condoms.  She is on birth control pills.  After further  "questioning she does remember a couple years when she has taken antibiotics for things like strep throat she feels the symptoms do get better but then they come back several months later.    Current Outpatient Prescriptions   Medication Sig Dispense Refill     albuterol (PROAIR HFA) 90 mcg/actuation inhaler Inhale 2 puffs every 4 (four) hours as needed for wheezing. 15 minutes before exercise and/or every 4 hours as needed. 1 Inhaler 3     norethindrone-ethinyl estradiol (MICROGESTIN 1/20) 1-20 mg-mcg per tablet TAKE 1 TABLET BY MOUTH DAILY. 84 tablet 3     ampicillin (PRINCIPEN) 500 MG capsule Take 1 capsule (500 mg total) by mouth 3 (three) times a day for 3 days. 9 capsule 0     desonide (DESOWEN) 0.05 % ointment Apply sparingly to affected area(s) twice daily.       No current facility-administered medications for this visit.      No Known Allergies  Past Medical History, Family History, and Social History reviewed.  Past Medical History:   Diagnosis Date     Scoliosis      No past surgical history on file.  Review of patient's allergies indicates no known allergies.  Family History   Problem Relation Age of Onset     No Medical Problems Mother      Stroke Father      No Medical Problems Brother      Social History     Social History     Marital status: Single     Spouse name: N/A     Number of children: N/A     Years of education: N/A     Occupational History     Not on file.     Social History Main Topics     Smoking status: Never Smoker     Smokeless tobacco: Not on file     Alcohol use No      Comment: occ social drink     Drug use: No     Sexual activity: No     Other Topics Concern     Not on file     Social History Narrative         Review of systems is as stated in HPI, and the remainder of the 10 system review is otherwise negative.    Objective:     Vitals:    11/01/17 1347   BP: 110/60   Pulse: 72   Weight: 142 lb 6.4 oz (64.6 kg)   Height: 5' 7.5\" (1.715 m)    Body mass index is 21.97 kg/(m^2).  Wt " Readings from Last 3 Encounters:   11/01/17 142 lb 6.4 oz (64.6 kg)   09/06/17 143 lb 1.6 oz (64.9 kg)   08/15/17 148 lb 1.6 oz (67.2 kg)       General Appearance:    Alert, cooperative, no distress, appears stated age    Head:    Normocephalic, without obvious abnormality, atraumatic   Eyes:    PERRL,  no conjunctivitis    Ears:    Normal  external ear canals   Nose:   Mucosa normal, no drainage       Throat:   Oropharynx is clear   Neck:   Supple, symmetrical, no adenopathy, no thyromegally, no carotid bruit        Lungs:      respirations unlabored   Chest Wall:    No tenderness or deformity    Heart:    Regular rate       Abdomen:     Soft, non-tender, bowel sounds active all four quadrants,     no masses, no organomegaly           Extremities:   Extremities normal, atraumatic, no cyanosis or edema   Pulses:   2+ and symmetric all extremities       Psych:   grossly normal mood and affect without acute anxiety or psychosis    Skin:   No rashes or lesions   Pelvis:  Uterus and adnexa are small mobile nontender.  There is no inguinal lymphadenopathy, no cervical motion tenderness.  There is a thick white discharge.         This note has been dictated using voice recognition software. Any grammatical or context distortions are unintentional and inherent to the software.

## 2021-06-14 NOTE — PROGRESS NOTES
Assessment/Plan:     1. Chronic vaginitis  Wet prep was normal.  Under microscopy it was reported to have a small amount of white blood cells but significantly improved from previous.  Patient has not gotten significant relief from trial of several treatments.  To discuss we could try vaginal clindamycin or metronidazole cream to see if it works better for her than oral medications.  She declines at this time.  Will refer to gynecology per her request for further workup and treatment.  - Wet Prep, Vaginal  - Ambulatory referral to Gynecology        Subjective:      Chelo Faulkner is a 22 y.o. female comes in today for follow-up of vaginitis.  Patient has had concerns with vaginitis for several years.  She typically has normal wet prep and cultures.  When I saw her last she did have wet prep negative for yeast bacteria and trichomonas but she had significant white blood cells seen so we tried ampicillin and performed vaginal culture.  Reviewed results vaginal culture came back negative.  Patient states with initial dose of ampicillin symptoms seemed to improve but then came back shortly afterwards.  We tried an extended dosing but she states that she only felt better for a couple days and then symptoms have continued since.  She states that she has chronic discharge but it does not itch or burn the biggest concern with her is a odor that she describes as sour.  Her periods are normal she has no significant pelvic pain she is sexually active.  She has consistent partner.  She is concerned because this has been going on for a while and she wants to make sure she does not have anything inside of her that is causing this.    Current Outpatient Prescriptions   Medication Sig Dispense Refill     albuterol (PROAIR HFA) 90 mcg/actuation inhaler Inhale 2 puffs every 4 (four) hours as needed for wheezing. 15 minutes before exercise and/or every 4 hours as needed. 1 Inhaler 3     desonide (DESOWEN) 0.05 % ointment Apply  "sparingly to affected area(s) twice daily.       norethindrone-ethinyl estradiol (MICROGESTIN 1/20) 1-20 mg-mcg per tablet TAKE 1 TABLET BY MOUTH DAILY. 84 tablet 3     No current facility-administered medications for this visit.      No Known Allergies  Past Medical History, Family History, and Social History reviewed.  Past Medical History:   Diagnosis Date     Scoliosis      No past surgical history on file.  Review of patient's allergies indicates no known allergies.  Family History   Problem Relation Age of Onset     No Medical Problems Mother      Stroke Father      No Medical Problems Brother      Social History     Social History     Marital status: Single     Spouse name: N/A     Number of children: N/A     Years of education: N/A     Occupational History     Not on file.     Social History Main Topics     Smoking status: Never Smoker     Smokeless tobacco: Not on file     Alcohol use No      Comment: occ social drink     Drug use: No     Sexual activity: No     Other Topics Concern     Not on file     Social History Narrative         Review of systems is as stated in HPI, and the remainder of the 10 system review is otherwise negative.    Objective:     Vitals:    11/21/17 1356   BP: 120/62   Patient Site: Right Arm   Patient Position: Sitting   Cuff Size: Adult Regular   Pulse: 66   SpO2: 99%   Weight: 141 lb 14.4 oz (64.4 kg)   Height: 5' 8\" (1.727 m)    Body mass index is 21.58 kg/(m^2).  Wt Readings from Last 3 Encounters:   11/21/17 141 lb 14.4 oz (64.4 kg)   11/01/17 142 lb 6.4 oz (64.6 kg)   09/06/17 143 lb 1.6 oz (64.9 kg)       General Appearance:    Alert, cooperative, no distress, appears stated age    Head:    Normocephalic, without obvious abnormality, atraumatic   Eyes:    PERRL no conjunctivitis    Ears:    Normal  external ear canals   Nose:   Mucosa normal, no drainage       Throat:   Oropharynx is clear   Neck:   Supple, symmetrical, no adenopathy       Lungs:     respirations unlabored "        Heart:    Regular rate        Abdomen:     Soft, non-tender, bowel sounds active all four quadrants,     no masses, no organomegaly           Extremities:   Extremities normal, atraumatic, no cyanosis or edema           Psych:   grossly normal mood and affect without acute anxiety or psychosis    Skin:   No rashes or lesions   Pelvic:  There is no inguinal lymphadenopathy uterus adnexa are small mobile nontender.  Normal vaginal vault with small amount of white discharge.  Cervix is normal-appearing and there is no cervical motion tenderness.         This note has been dictated using voice recognition software. Any grammatical or context distortions are unintentional and inherent to the software.

## 2021-06-15 NOTE — PROGRESS NOTES
ASSESSMENT/PLAN:  1. Routine general medical examination at a health care facility  27 yo healthy female. Up to date on pap smears.  Encourage healthy eating and exercise.  Fasting labs drawn today.  - Lipid [place] FASTING  - Glucose  - Hemoglobin    2. Urinary tract infection  UA is negative for infection.    - Urinalysis-UC if Indicated  - Culture, Urine    3. Mild intermittent asthma without complication  Intermittently uses albuterol.  Well controlled    4. Vaginosis  Bacterial vaginosis treated with metronidazole.  - Wet Prep, Vaginal      Dragon dictation was used for this note.  Speech recognition errors are a possibility.      Orders Placed This Encounter   Procedures     Culture, Urine     Wet Prep, Vaginal     Urinalysis-UC if Indicated     Lipid [place] FASTING     Order Specific Question:   Fasting is required?     Answer:   Yes     Glucose     Hemoglobin     Medications Discontinued During This Encounter   Medication Reason     azithromycin (ZITHROMAX) 250 MG tablet Therapy completed     benzonatate (TESSALON PERLES) 100 MG capsule Therapy completed         CHIEF COMPLAINT:  Chief Complaint   Patient presents with     Annual Exam     Urinary Tract Infection     frequent urination and itch after longer day of work or physically active x 1 weeks.       HISTORY OF PRESENT ILLNESS:  Chelo is a 26 y.o. female presenting to the clinic today for an annual exam. Overall she has been healthy.  She is noting some vaginal discharge with mild odor.  No significant irritation.  She is also noting increased frequency of urination.  Asthma is well controlled and she would like a renewal of her birth control    Remainder of 12-point ROS is negative.      Social History     Tobacco Use   Smoking Status Never Smoker   Smokeless Tobacco Never Used       Family History   Problem Relation Age of Onset     No Medical Problems Mother      Stroke Father 40     No Medical Problems Brother        Social History      Socioeconomic History     Marital status: Single     Spouse name: Not on file     Number of children: Not on file     Years of education: Not on file     Highest education level: Not on file   Occupational History     Not on file   Social Needs     Financial resource strain: Not on file     Food insecurity     Worry: Not on file     Inability: Not on file     Transportation needs     Medical: Not on file     Non-medical: Not on file   Tobacco Use     Smoking status: Never Smoker     Smokeless tobacco: Never Used   Substance and Sexual Activity     Alcohol use: Yes     Frequency: Monthly or less     Drinks per session: 1 or 2     Binge frequency: Never     Comment: occ social drink     Drug use: No     Sexual activity: Never     Birth control/protection: Pill   Lifestyle     Physical activity     Days per week: Not on file     Minutes per session: Not on file     Stress: Not on file   Relationships     Social connections     Talks on phone: Not on file     Gets together: Not on file     Attends [employer] service: Not on file     Active member of club or organization: Not on file     Attends meetings of clubs or organizations: Not on file     Relationship status: Not on file     Intimate partner violence     Fear of current or ex partner: Not on file     Emotionally abused: Not on file     Physically abused: Not on file     Forced sexual activity: Not on file   Other Topics Concern     Not on file   Social History Narrative     Not on file       No past surgical history on file.    No Known Allergies    Active Ambulatory Problems     Diagnosis Date Noted     Intermittent asthma      Eczema      Scoliosis 03/12/2015     Lower back pain 03/12/2015     Resolved Ambulatory Problems     Diagnosis Date Noted     Allergic rhinitis 12/11/2014     Acute bacterial conjunctivitis 03/11/2015     Acute sinusitis 03/11/2015     Acute upper respiratory infection 03/11/2015     Difficulty breathing (Dyspnea) 03/11/2015     No  "Additional Past Medical History       VITALS:  Vitals:    03/04/21 0914   BP: 126/72   Patient Site: Right Arm   Patient Position: Sitting   Cuff Size: Adult Regular   Pulse: 90   Weight: 153 lb 1.6 oz (69.4 kg)   Height: 5' 7.32\" (1.71 m)     Wt Readings from Last 3 Encounters:   03/04/21 153 lb 1.6 oz (69.4 kg)   01/11/19 137 lb 1 oz (62.2 kg)   11/21/17 141 lb 14.4 oz (64.4 kg)     Body mass index is 23.75 kg/m .    PHYSICAL EXAM:  General Appearance: Alert, pleasant, appears stated age  Head: Normocephalic, without obvious abnormality  Eyes: PERRL, conjunctiva/corneas clear, EOM's intact  Ears: Normal TM's and external ear canals, both ears  Nose: Nares normal, septum midline,mucosa normal, no drainage  Throat: Lips, mucosa, and tongue normal; teeth and gums normal; oropharynx is clear  Neck: Supple,without lymphadenopathy or thyromegaly  Lungs: Clear to auscultation bilaterally, respirations unlabored  Breasts: No palpable masses, tenderness, asymmetry, or nipple discharge. No axillary or supraclavicular lymphadenopathy  Heart: Regular rate and rhythm, no murmur   Abdomen: Soft, non-tender, no masses, no organomegaly  Pelvic: Normally developed genitalia with no external lesions or eruptions. Vagina and cervix show no lesions. No cystocele, No rectocele. Uterus normal.  No adnexal mass or tenderness. Mild vaginal discharge  Extremities: Extremities with strong and symmetric pulses, no cyanosis or edema  Skin: Skin color, texture normal, no rashes or lesions  Neuro: Normal    RECENT RESULTS  No results found for this or any previous visit (from the past 48 hour(s)).    MEDICATIONS:  Current Outpatient Medications   Medication Sig Dispense Refill     albuterol (PROAIR HFA) 90 mcg/actuation inhaler Inhale 2 puffs every 4 (four) hours as needed for wheezing. 15 minutes before exercise and/or every 4 hours as needed. 1 Inhaler 3     norethindrone-ethinyl estradiol (JUNEL 1/20, 21,) 1-20 mg-mcg per tablet TAKE 1 " TABLET BY MOUTH EVERY DAY 84 tablet 3     No current facility-administered medications for this visit.    Asthma- triggered by anxiety and exercise  Scoliosis- exercises

## 2021-06-23 NOTE — PROGRESS NOTES
Assessment:        1. Health care maintenance  Gynecologic Cytology (PAP Smear)    Lipid Reeves RANDOM   2. Encounter for surveillance of contraceptive pills  norethindrone-ethinyl estradiol (MICROGESTIN 1/20) 1-20 mg-mcg per tablet   3. Screen for STD (sexually transmitted disease)  Chlamydia trachomatis & Neisseria gonorrhoeae, Amplified Detection    HIV Antigen/Antibody Screening Reeves    Syphilis Screen, Cascade    Hepatitis B Surface Antigen (HBsAG)   4. Anxiety  Thyroid Stimulating Hormone (TSH)    T4, Free   5. Chronic fatigue  HM2(CBC w/o Differential)    Vitamin D, Total (25-Hydroxy)    Vitamin B12    Ferritin    Iron and Transferrin Iron Binding Capacity    Comprehensive Metabolic Panel   6. Vaginal discharge  Wet Prep, Vaginal   7. Mild intermittent asthma without complication         Plan:      1. Healthcare maintenance: Pap smear performed.  Check lipids and glucose.  Declines influenza vaccine.  Encouraged regular exercise and healthy diet.  2. Surveillance of contraceptive pills: Doing well on current birth control pill.  Refill provided  3. STD screen: Gonorrhea chlamydia swab obtained.  Blood work for HIV, syphilis and hepatitis B surface antigen order  4. Anxiety: Check TSH and free T4.  She would like to resume Prozac.  Prescription for fluoxetine 20 mg daily sent to pharmacy.  Directed her to take half tablet daily for 1 week and then increase to 1 tablet daily.  Counseled on use of medication and side effects.  Follow-up in 1 month.  5. Fatigue: Check above labs.  Start treatment for anxiety as noted above.  Follow-up in 1 month  6. Vaginal discharge: Wet prep and GC chlamydia probe obtained  7. Mild intermittent asthma: Doing well.  Asthma action plan completed.  Continue albuterol inhaler as needed.          Subjective:      Chelo Faulkner is a 23 y.o. female who presents for an annual exam.  Reviewed her health history.  Overall healthy individual.  No significant changes since she was  last seen in clinic.  Continues to have chronic vaginal discharge.  Went to gynecologist but gynecologist unable to determine underlying etiology.  Suggested it may be related to her birth control pills.  No changes in contraception were made though.  History of intermittent asthma.  Rarely uses albuterol inhaler.  Flares up with intense physical activity or when around cats or when allergies are acting up.  Remains on birth control pills.  These are working well for her.  No adverse side effects.  Has noticed a little bit of decreased libido and wonders if it could be related to the pill.  She has history of anxiety.  Took fluoxetine when she was in high school.  Has not taking this medication for several years.  Is thinking that she may need to go back on it.  Has noticed increased levels of anxiety.  Also some associated depression.  Feels very fatigued and sleeps a lot.  Does not eat a lot of meat.  Concerned about thyroid disorder.  Mother and grandmother with hypothyroidism.  Agrees to STD testing today.  Also agrees to Pap smear.  Declines influenza vaccine.  Up-to-date on other routine preventive cares.  Allergies, medications, family and social history reviewed and updated.  Review of systems assessed and otherwise negative.  No other concerns or questions today.    Healthy Habits:   Regular Exercise: Yes  Sunscreen Use: Yes  Healthy Diet: Yes  Dental Visits Regularly: Yes  Seat Belt: Yes  Sexually active: Yes  Self Breast Exam Monthly:Yes  Hemoccults: N/A  Flex Sig: N/A  Colonoscopy: N/A  Lipid Profile: yes  Glucose Screen: No  Prevention of Osteoporosis: Yes  Last Dexa: N/A         Immunization History   Administered Date(s) Administered     DTaP, historic 1994, 1994, 1994, 05/16/1995, 04/08/1999     HPV Quadrivalent 07/31/2008, 09/29/2008, 01/30/2009     Hep A, historic 07/31/2008, 01/30/2009     Hep B, historic 1994, 1994, 1994     HiB, historic,unspecified  1994, 1994, 1994, 05/16/1995     IPV 1994, 1994, 1994, 04/08/1999     Influenza, seasonal,quad inj 36+ mos 09/26/2016     Influenza, seasonal,quad inj 6-35 mos 12/11/2014     Influenza,seasonal quad, PF, 36+MOS 09/26/2016     MMR 05/16/1995, 04/08/1999     Meningococcal MCV4P 07/31/2008     Td,adult,historic,unspecified 05/22/2006     Tdap 05/22/2006, 09/26/2016     Varicella 01/01/1997     Immunization status: up to date and documented.      Gynecologic History  Patient's last menstrual period was 01/01/2019.  Contraception: OCP (estrogen/progesterone)  Last Pap: 2016. Results were: normal        OB History   No data available       Current Outpatient Medications   Medication Sig Dispense Refill     albuterol (PROAIR HFA) 90 mcg/actuation inhaler Inhale 2 puffs every 4 (four) hours as needed for wheezing. 15 minutes before exercise and/or every 4 hours as needed. 1 Inhaler 3     norethindrone-ethinyl estradiol (MICROGESTIN 1/20) 1-20 mg-mcg per tablet TAKE 1 TABLET BY MOUTH DAILY. 84 tablet 3     FLUoxetine (PROZAC) 20 MG tablet Take 1/2 tablet by mouth daily for 1 week and then increase to 1 tablet by mouth daily. 30 tablet 1     No current facility-administered medications for this visit.      Past Medical History:   Diagnosis Date     Scoliosis      History reviewed. No pertinent surgical history.  Patient has no known allergies.  Family History   Problem Relation Age of Onset     No Medical Problems Mother      Stroke Father      No Medical Problems Brother      Social History     Socioeconomic History     Marital status: Single     Spouse name: Not on file     Number of children: Not on file     Years of education: Not on file     Highest education level: Not on file   Social Needs     Financial resource strain: Not on file     Food insecurity - worry: Not on file     Food insecurity - inability: Not on file     Transportation needs - medical: Not on file     Transportation  "needs - non-medical: Not on file   Occupational History     Not on file   Tobacco Use     Smoking status: Never Smoker     Smokeless tobacco: Never Used   Substance and Sexual Activity     Alcohol use: No     Comment: occ social drink     Drug use: No     Sexual activity: No     Birth control/protection: Pill   Other Topics Concern     Not on file   Social History Narrative     Not on file       Review of Systems    See HPI      Objective:         /60 (Patient Site: Right Arm, Patient Position: Sitting, Cuff Size: Adult Regular)   Pulse 82   Temp 98.4  F (36.9  C) (Oral)   Ht 5' 7.5\" (1.715 m)   Wt 137 lb 1 oz (62.2 kg)   LMP 01/01/2019   SpO2 96%   Breastfeeding? No   BMI 21.15 kg/m        Physical Exam:  General Appearance: Alert, cooperative, no distress, appears stated age  Head: Normocephalic, without obvious abnormality, atraumatic  Eyes: PERRL, conjunctiva/corneas clear, EOM's intact  Ears: Normal TM's and external ear canals, both ears  Nose: Nares normal, septum midline,mucosa normal, no drainage  Throat: Lips, mucosa, and tongue normal; teeth and gums normal  Neck: Supple, symmetrical, trachea midline, no adenopathy;  thyroid: not enlarged, symmetric, no tenderness/mass/nodules;  Back: Symmetric, no curvature, ROM normal  Lungs: Clear to auscultation bilaterally, respirations unlabored  Breasts: No breast masses, tenderness, asymmetry, or nipple discharge.  Heart: Regular rate and rhythm, S1 and S2 normal, no murmur, rub, or gallop, Abdomen: Soft, non-tender, bowel sounds active all four quadrants,  no masses, no organomegaly  Pelvic:Normally developed genitalia with no external lesions or eruptions. Vaginal discharge present. No cystocele, No rectocele. Uterus normal.  No adnexal mass or tenderness.  Extremities: Extremities normal, atraumatic, no cyanosis or edema  Skin: Skin color, texture, turgor normal, no rashes or lesions  Lymph nodes: Cervical, supraclavicular, and axillary nodes " normal  Neurologic: Normal       Results: ACT: 25   RANJAN-7: 14   PHQ-9: 4

## 2021-06-23 NOTE — TELEPHONE ENCOUNTER
Medication Question or Clarification  Who is calling: Pharmacy: [facility]  What medication are you calling about?: Fluoxetine  What dose do you take?: 20 mg  How often are you taking the medication?: 1/2 tab for 1 week then 1 tab for remaining.   Who prescribed the medication?: Leigh Ann Monterroso MD  What is your question/concern?: Insurance is requesting a change to capsules. Pharmacy is suggesting   Fluoxetine 10 mg capsules x7 days  Fluoxetine 20 mg capsules   Pharmacy: [facility] in [facility]   Okay to leave a detailed message?: Yes  Site CMT - Please call the pharmacy to obtain any additional needed information.

## 2021-06-25 NOTE — TELEPHONE ENCOUNTER
Refill Approved    Rx renewed per Medication Renewal Policy. Medication was last renewed on 1/11/19.    Neeta Bland, [facility] Triage/Med Refill 3/21/2019     Requested Prescriptions   Pending Prescriptions Disp Refills     FLUoxetine (PROZAC) 20 MG capsule [Pharmacy Med Name: FLUOXETINE HCL 20 MG CAPSULE] 30 capsule 1     Sig: TAKE 1 CAPSULE BY MOUTH EVERY DAY    SSRI Refill Protocol  Passed - 3/19/2019  1:30 AM       Passed - PCP or prescribing provider visit in last year    Last office visit with prescriber/PCP: 9/6/2017 Leigh Ann Monterroso MD OR same dept: Visit date not found OR same specialty: 11/21/2017 Gissel Desouza DO  Last physical: 1/11/2019 Last MTM visit: Visit date not found   Next visit within 3 mo: Visit date not found  Next physical within 3 mo: Visit date not found  Prescriber OR PCP: Leigh Ann Monterroso MD  Last diagnosis associated with med order: There are no diagnoses linked to this encounter.  If protocol passes may refill for 12 months if within 3 months of last provider visit (or a total of 15 months).

## 2021-09-10 ENCOUNTER — OFFICE VISIT (OUTPATIENT)
Dept: FAMILY MEDICINE | Facility: CLINIC | Age: 26
End: 2021-09-10
Payer: COMMERCIAL

## 2021-09-10 VITALS
OXYGEN SATURATION: 99 % | BODY MASS INDEX: 23 KG/M2 | DIASTOLIC BLOOD PRESSURE: 82 MMHG | HEART RATE: 77 BPM | WEIGHT: 148.3 LBS | SYSTOLIC BLOOD PRESSURE: 110 MMHG

## 2021-09-10 DIAGNOSIS — N76.0 BACTERIAL VAGINOSIS: ICD-10-CM

## 2021-09-10 DIAGNOSIS — B96.89 BACTERIAL VAGINOSIS: ICD-10-CM

## 2021-09-10 DIAGNOSIS — Z11.3 SCREEN FOR STD (SEXUALLY TRANSMITTED DISEASE): ICD-10-CM

## 2021-09-10 DIAGNOSIS — J02.9 PHARYNGITIS, UNSPECIFIED ETIOLOGY: Primary | ICD-10-CM

## 2021-09-10 LAB
CLUE CELLS: PRESENT
DEPRECATED S PYO AG THROAT QL EIA: NEGATIVE
HOLD SPECIMEN: NORMAL
TRICHOMONAS, WET PREP: ABNORMAL
WBC'S/HIGH POWER FIELD, WET PREP: ABNORMAL
YEAST, WET PREP: ABNORMAL

## 2021-09-10 PROCEDURE — 86780 TREPONEMA PALLIDUM: CPT | Performed by: FAMILY MEDICINE

## 2021-09-10 PROCEDURE — 87389 HIV-1 AG W/HIV-1&-2 AB AG IA: CPT | Performed by: FAMILY MEDICINE

## 2021-09-10 PROCEDURE — 87591 N.GONORRHOEAE DNA AMP PROB: CPT | Performed by: FAMILY MEDICINE

## 2021-09-10 PROCEDURE — 36415 COLL VENOUS BLD VENIPUNCTURE: CPT | Performed by: FAMILY MEDICINE

## 2021-09-10 PROCEDURE — 87340 HEPATITIS B SURFACE AG IA: CPT | Performed by: FAMILY MEDICINE

## 2021-09-10 PROCEDURE — 99214 OFFICE O/P EST MOD 30 MIN: CPT | Performed by: FAMILY MEDICINE

## 2021-09-10 PROCEDURE — 87491 CHLMYD TRACH DNA AMP PROBE: CPT | Performed by: FAMILY MEDICINE

## 2021-09-10 PROCEDURE — 87651 STREP A DNA AMP PROBE: CPT | Performed by: FAMILY MEDICINE

## 2021-09-10 PROCEDURE — 86803 HEPATITIS C AB TEST: CPT | Performed by: FAMILY MEDICINE

## 2021-09-10 PROCEDURE — 87210 SMEAR WET MOUNT SALINE/INK: CPT | Performed by: FAMILY MEDICINE

## 2021-09-10 ASSESSMENT — ASTHMA QUESTIONNAIRES
QUESTION_2 LAST FOUR WEEKS HOW OFTEN HAVE YOU HAD SHORTNESS OF BREATH: NOT AT ALL
ACUTE_EXACERBATION_TODAY: NO
QUESTION_1 LAST FOUR WEEKS HOW MUCH OF THE TIME DID YOUR ASTHMA KEEP YOU FROM GETTING AS MUCH DONE AT WORK, SCHOOL OR AT HOME: NONE OF THE TIME
QUESTION_4 LAST FOUR WEEKS HOW OFTEN HAVE YOU USED YOUR RESCUE INHALER OR NEBULIZER MEDICATION (SUCH AS ALBUTEROL): TWO OR THREE TIMES PER WEEK
ACT_TOTALSCORE: 23
QUESTION_3 LAST FOUR WEEKS HOW OFTEN DID YOUR ASTHMA SYMPTOMS (WHEEZING, COUGHING, SHORTNESS OF BREATH, CHEST TIGHTNESS OR PAIN) WAKE YOU UP AT NIGHT OR EARLIER THAN USUAL IN THE MORNING: NOT AT ALL
QUESTION_5 LAST FOUR WEEKS HOW WOULD YOU RATE YOUR ASTHMA CONTROL: COMPLETELY CONTROLLED

## 2021-09-10 NOTE — PROGRESS NOTES
Assessment & Plan     Pharyngitis, unspecified etiology  Strep test is negative.  Provided reassurance.  At this point would recommend monitoring of symptoms.  Encouraged increased fluid intake and she admits that she may be a little bit dehydrated.  Notify us if any new symptoms of concern develop  - Streptococcus A Rapid Scr w Reflx to PCR - Lab Collect  - Group A Streptococcus PCR Throat Swab    Screen for STD (sexually transmitted disease)  STD screening will be performed as noted below  - NEISSERIA GONORRHOEA PCR  - CHLAMYDIA TRACHOMATIS PCR  - HIV Antigen Antibody Combo  - Treponema Abs w Reflex to RPR and Titer  - Hepatitis B surface antigen  - Hepatitis C antibody  - Wet prep - lab collect  - HIV Antigen Antibody Combo  - Treponema Abs w Reflex to RPR and Titer  - Hepatitis B surface antigen  - Hepatitis C antibody  - Wet prep - lab collect    Bacterial vaginosis  Wet prep shows presence of clue cells.  We will plan to treat with metronidazole                   No follow-ups on file.    Leigh Ann Monterroso MD  [facility] JORDAN Whitney is a 26 year old who presents for the following health issues     HPI   She comes in today for STD testing.  She has a new sexual partner and would like to be safe.  She is not experiencing any particular symptoms of concern.  She does have a history of vaginal discharge and bacterial vaginosis.  States that her current vaginal discharge is not different than normal.  She has not had pelvic pain, vaginal itching or irritation.  She is primarily concerned about some bumps on the back of her tongue and a fuzzy feeling on her tongue.  Tongue feels a little bit swollen almost.  This has been happening for the past 1 to 2 days.  She has had some nasal congestion and rhinorrhea.  She is concerned that she may have strep throat.  In the past her tongue has felt like this when she has had strep.  She does not have a sore throat.  She has not had headache.   No fevers or chills.  No cough.  No sick contacts that she is aware of.  Review of systems is otherwise negative.  She has no other concerns or questions.        Review of Systems         Objective    /82 (BP Location: Right arm, Patient Position: Sitting, Cuff Size: Adult Regular)   Pulse 77   Wt 67.3 kg (148 lb 4.8 oz)   SpO2 99%   BMI 23.00 kg/m    Body mass index is 23 kg/m .  Physical Exam   GENERAL: healthy, alert and no distress  EYES: Eyes grossly normal to inspection, PERRL and conjunctivae and sclerae normal  HENT: normal cephalic/atraumatic, ear canals and TM's normal, nose and mouth without ulcers or lesions, nasal mucosa edematous , rhinorrhea clear, oropharynx clear and oral mucous membranes moist  NECK: no adenopathy, no asymmetry, masses, or scars and thyroid normal to palpation    Results for orders placed or performed in visit on 09/10/21   HIV Antigen Antibody Combo     Status: Normal   Result Value Ref Range    HIV Antigen Antibody Combo Negative Negative   Treponema Abs w Reflex to RPR and Titer     Status: Normal   Result Value Ref Range    Treponema Antibody Total Negative Negative   Hepatitis B surface antigen     Status: Normal   Result Value Ref Range    Hepatitis B Surface Antigen Nonreactive Nonreactive   Hepatitis C antibody     Status: Normal   Result Value Ref Range    Hepatitis C Antibody Negative Negative    Narrative    Assay performance characteristics have not been established for newborns, infants, children (<18 years) or populations of immunocompromised or immunosuppressed patients.    Extra Purple Top Tube     Status: None   Result Value Ref Range    Hold Specimen JIC    Streptococcus A Rapid Scr w Reflx to PCR - Lab Collect     Status: Normal    Specimen: Throat; Swab   Result Value Ref Range    Group A Strep antigen Negative Negative   Group A Streptococcus PCR Throat Swab     Status: Normal    Specimen: Throat; Swab   Result Value Ref Range    Group A strep by PCR  Not Detected Not Detected    Narrative    The Xpert Xpress Strep A test, performed on the [employer], is a rapid, qualitative in vitro diagnostic test for the detection of Streptococcus pyogenes (Group A ß-hemolytic Streptococcus, Strep A) in throat swab specimens from patients with signs and symptoms of pharyngitis. The Xpert Xpress Strep A test can be used as an aid in the diagnosis of Group A Streptococcal pharyngitis. The assay is not intended to monitor treatment for Group A Streptococcus infections. The Xpert Xpress Strep A test utilizes an automated real-time polymerase chain reaction (PCR) to detect Streptococcus pyogenes DNA.   Wet prep - lab collect     Status: Abnormal    Specimen: Vagina; Swab   Result Value Ref Range    Trichomonas Absent Absent    Yeast Absent Absent    Clue Cells Present (A) Absent    WBCs/high power field 4+ (A) None   Extra Tube     Status: None    Narrative    The following orders were created for panel order Extra Tube.  Procedure                               Abnormality         Status                     ---------                               -----------         ------                     Extra Purple Top Tube[[id number]]                            Final result                 Please view results for these tests on the individual orders.

## 2021-09-11 LAB
GROUP A STREP BY PCR: NOT DETECTED
HBV SURFACE AG SERPL QL IA: NONREACTIVE
HCV AB SERPL QL IA: NEGATIVE
HIV 1+2 AB+HIV1 P24 AG SERPL QL IA: NEGATIVE
T PALLIDUM AB SER QL: NEGATIVE

## 2021-09-11 ASSESSMENT — ASTHMA QUESTIONNAIRES: ACT_TOTALSCORE: 23

## 2021-09-12 LAB
C TRACH DNA SPEC QL NAA+PROBE: NEGATIVE
N GONORRHOEA DNA SPEC QL NAA+PROBE: NEGATIVE

## 2021-09-13 ENCOUNTER — TELEPHONE (OUTPATIENT)
Dept: FAMILY MEDICINE | Facility: CLINIC | Age: 26
End: 2021-09-13

## 2021-09-13 DIAGNOSIS — N76.0 BACTERIAL VAGINOSIS: Primary | ICD-10-CM

## 2021-09-13 DIAGNOSIS — B96.89 BACTERIAL VAGINOSIS: Primary | ICD-10-CM

## 2021-09-13 RX ORDER — METRONIDAZOLE 500 MG/1
500 TABLET ORAL 2 TIMES DAILY
Qty: 14 TABLET | Refills: 0 | Status: SHIPPED | OUTPATIENT
Start: 2021-09-13 | End: 2021-09-20

## 2021-09-13 NOTE — TELEPHONE ENCOUNTER
----- Message from Leigh Ann Monterroso MD sent at 9/12/2021 11:10 AM CDT -----  Please the patient know her wet prep shows clue cells.  This indicates that she has bacterial vaginosis. Does she prefer oral or vaginal metronidazole for treatment?

## 2021-10-16 ENCOUNTER — HEALTH MAINTENANCE LETTER (OUTPATIENT)
Age: 26
End: 2021-10-16

## 2021-11-22 ENCOUNTER — E-VISIT (OUTPATIENT)
Dept: FAMILY MEDICINE | Facility: CLINIC | Age: 26
End: 2021-11-22
Payer: COMMERCIAL

## 2021-11-22 DIAGNOSIS — F33.0 MAJOR DEPRESSIVE DISORDER, RECURRENT EPISODE, MILD (H): Primary | ICD-10-CM

## 2021-11-22 PROCEDURE — 99421 OL DIG E/M SVC 5-10 MIN: CPT | Performed by: FAMILY MEDICINE

## 2021-11-22 ASSESSMENT — ANXIETY QUESTIONNAIRES
4. TROUBLE RELAXING: SEVERAL DAYS
1. FEELING NERVOUS, ANXIOUS, OR ON EDGE: MORE THAN HALF THE DAYS
8. IF YOU CHECKED OFF ANY PROBLEMS, HOW DIFFICULT HAVE THESE MADE IT FOR YOU TO DO YOUR WORK, TAKE CARE OF THINGS AT HOME, OR GET ALONG WITH OTHER PEOPLE?: SOMEWHAT DIFFICULT
7. FEELING AFRAID AS IF SOMETHING AWFUL MIGHT HAPPEN: SEVERAL DAYS
7. FEELING AFRAID AS IF SOMETHING AWFUL MIGHT HAPPEN: SEVERAL DAYS
5. BEING SO RESTLESS THAT IT IS HARD TO SIT STILL: NOT AT ALL
2. NOT BEING ABLE TO STOP OR CONTROL WORRYING: SEVERAL DAYS
GAD7 TOTAL SCORE: 9
3. WORRYING TOO MUCH ABOUT DIFFERENT THINGS: MORE THAN HALF THE DAYS
6. BECOMING EASILY ANNOYED OR IRRITABLE: MORE THAN HALF THE DAYS
GAD7 TOTAL SCORE: 9
GAD7 TOTAL SCORE: 9

## 2021-11-22 ASSESSMENT — PATIENT HEALTH QUESTIONNAIRE - PHQ9
SUM OF ALL RESPONSES TO PHQ QUESTIONS 1-9: 7
SUM OF ALL RESPONSES TO PHQ QUESTIONS 1-9: 7
10. IF YOU CHECKED OFF ANY PROBLEMS, HOW DIFFICULT HAVE THESE PROBLEMS MADE IT FOR YOU TO DO YOUR WORK, TAKE CARE OF THINGS AT HOME, OR GET ALONG WITH OTHER PEOPLE: SOMEWHAT DIFFICULT

## 2021-11-22 NOTE — LETTER
To Whom it May Concern:    Chelo Faulkner is my patient, and has been under my care since at the [facility] for many years. I am familiar with her history and with the functional limitations imposed by her emotional/mental related illness.    She meets the definition of disability under the Americans with Disabilities Act, the Fair Housing Act, and the Rehabilitation Act of 1973.    Due to this emotional/mental disability, Chelo has certain limitations related to anxiety and depression. In order to help alleviate these difficulties, and to enhance her ability to live independently and to fully use and enjoy the dwelling unit you own and/or administer, I have prescribed Chelo to obtain a pet or emotional support animal. The presence of this animal is necessary for the emotional/mental health of Chelo because its presence will mitigate the symptoms she is currently experiencing.    Sincerely,    Leigh Ann Monterroso MD

## 2021-11-23 ASSESSMENT — ANXIETY QUESTIONNAIRES: GAD7 TOTAL SCORE: 9

## 2021-11-23 ASSESSMENT — PATIENT HEALTH QUESTIONNAIRE - PHQ9: SUM OF ALL RESPONSES TO PHQ QUESTIONS 1-9: 7

## 2021-11-29 ENCOUNTER — OFFICE VISIT (OUTPATIENT)
Dept: INTERNAL MEDICINE | Facility: CLINIC | Age: 26
End: 2021-11-29
Payer: COMMERCIAL

## 2021-11-29 VITALS
DIASTOLIC BLOOD PRESSURE: 78 MMHG | SYSTOLIC BLOOD PRESSURE: 118 MMHG | BODY MASS INDEX: 24.15 KG/M2 | OXYGEN SATURATION: 98 % | HEART RATE: 95 BPM | RESPIRATION RATE: 16 BRPM | WEIGHT: 155.7 LBS | TEMPERATURE: 98.2 F

## 2021-11-29 DIAGNOSIS — N76.0 VAGINITIS AND VULVOVAGINITIS: ICD-10-CM

## 2021-11-29 DIAGNOSIS — N89.8 VAGINAL DISCHARGE: Primary | ICD-10-CM

## 2021-11-29 LAB
CLUE CELLS: ABNORMAL
TRICHOMONAS, WET PREP: ABNORMAL
WBC'S/HIGH POWER FIELD, WET PREP: ABNORMAL
YEAST, WET PREP: ABNORMAL

## 2021-11-29 PROCEDURE — 99213 OFFICE O/P EST LOW 20 MIN: CPT | Performed by: PHYSICIAN ASSISTANT

## 2021-11-29 PROCEDURE — 87210 SMEAR WET MOUNT SALINE/INK: CPT | Performed by: PHYSICIAN ASSISTANT

## 2021-11-29 RX ORDER — FLUCONAZOLE 150 MG/1
150 TABLET ORAL ONCE
Qty: 1 TABLET | Refills: 0 | Status: SHIPPED | OUTPATIENT
Start: 2021-11-29 | End: 2021-11-29

## 2021-11-29 RX ORDER — METRONIDAZOLE 500 MG/1
500 TABLET ORAL 2 TIMES DAILY
Qty: 14 TABLET | Refills: 0 | Status: SHIPPED | OUTPATIENT
Start: 2021-11-29 | End: 2021-12-06

## 2021-11-29 NOTE — PROGRESS NOTES
Assessment & Plan     Vaginal discharge    - Wet prep - Clinic Collect  - metroNIDAZOLE (FLAGYL) 500 MG tablet; Take 1 tablet (500 mg) by mouth 2 times daily for 7 days  - fluconazole (DIFLUCAN) 150 MG tablet; Take 1 tablet (150 mg) by mouth once for 1 dose    Vaginitis and vulvovaginitis    - metroNIDAZOLE (FLAGYL) 500 MG tablet; Take 1 tablet (500 mg) by mouth 2 times daily for 7 days  - fluconazole (DIFLUCAN) 150 MG tablet; Take 1 tablet (150 mg) by mouth once for 1 dose             Reviewed results on my chart  Clinically appears to be BV.. will treat empirically     Return in about 3 weeks (around 12/20/2021) for recheck if not improving, regular primary provider.    CURTIS Fontaine [facility]ANGELOSierra [facility] [place] is a 26 year old who presents for the following health issues     HPI     Vaginal Symptoms  Onset/Duration: 1 week  Description:  Vaginal Discharge: Greenish in color   Itching (Pruritis): YES  Burning sensation:  no  Odor: YES  Accompanying Signs & Symptoms:  Urinary symptoms: no  Abdominal pain: no  Fever: no  History:   Sexually active: YES  New Partner: YES  Possibility of Pregnancy:  no  Recent antibiotic use: no  Previous vaginitis issues: YES  Precipitating or alleviating factors:   Therapies tried and outcome: none          Review of Systems   Constitutional, HEENT, cardiovascular, pulmonary, gi and gu systems are negative, except as otherwise noted.      Objective    /78   Pulse 95   Temp 98.2  F (36.8  C) (Tympanic)   Resp 16   Wt 70.6 kg (155 lb 11.2 oz)   LMP 11/15/2021 (Approximate)   SpO2 98%   BMI 24.15 kg/m    Body mass index is 24.15 kg/m .  Physical Exam   GENERAL: healthy, alert and no distress  ABDOMEN: soft, nontender, no hepatosplenomegaly, no masses and bowel sounds normal   (female): normal female external genitalia, normal urethral meatus , vaginal mucosa pink, moist, well rugated and vaginal discharge - copious,  yellow and green  SKIN: no suspicious lesions or rashes    Results for orders placed or performed in visit on 11/29/21 (from the past 24 hour(s))   Wet prep - Clinic Collect    Specimen: Vagina; Swab   Result Value Ref Range    Trichomonas Absent Absent    Yeast Absent Absent    Clue Cells Absent Absent    WBCs/high power field 2+ (A) None

## 2021-12-28 ENCOUNTER — OFFICE VISIT (OUTPATIENT)
Dept: FAMILY MEDICINE | Facility: CLINIC | Age: 26
End: 2021-12-28
Payer: COMMERCIAL

## 2021-12-28 VITALS
WEIGHT: 157 LBS | SYSTOLIC BLOOD PRESSURE: 114 MMHG | HEART RATE: 91 BPM | DIASTOLIC BLOOD PRESSURE: 72 MMHG | OXYGEN SATURATION: 99 % | BODY MASS INDEX: 24.35 KG/M2

## 2021-12-28 DIAGNOSIS — N89.8 VAGINAL IRRITATION: Primary | ICD-10-CM

## 2021-12-28 LAB
ALBUMIN UR-MCNC: NEGATIVE MG/DL
APPEARANCE UR: ABNORMAL
BACTERIA #/AREA URNS HPF: ABNORMAL /HPF
BILIRUB UR QL STRIP: NEGATIVE
CLUE CELLS: ABNORMAL
COLOR UR AUTO: YELLOW
GLUCOSE UR STRIP-MCNC: NEGATIVE MG/DL
HGB UR QL STRIP: NEGATIVE
KETONES UR STRIP-MCNC: NEGATIVE MG/DL
LEUKOCYTE ESTERASE UR QL STRIP: ABNORMAL
NITRATE UR QL: NEGATIVE
PH UR STRIP: 7 [PH] (ref 5–8)
RBC #/AREA URNS AUTO: ABNORMAL /HPF
SP GR UR STRIP: 1.02 (ref 1–1.03)
SQUAMOUS #/AREA URNS AUTO: ABNORMAL /LPF
TRICHOMONAS, WET PREP: ABNORMAL
UROBILINOGEN UR STRIP-ACNC: 0.2 E.U./DL
WBC #/AREA URNS AUTO: ABNORMAL /HPF
WBC'S/HIGH POWER FIELD, WET PREP: ABNORMAL
YEAST, WET PREP: ABNORMAL

## 2021-12-28 PROCEDURE — 87210 SMEAR WET MOUNT SALINE/INK: CPT | Performed by: FAMILY MEDICINE

## 2021-12-28 PROCEDURE — 99213 OFFICE O/P EST LOW 20 MIN: CPT | Performed by: FAMILY MEDICINE

## 2021-12-28 PROCEDURE — 87086 URINE CULTURE/COLONY COUNT: CPT | Performed by: FAMILY MEDICINE

## 2021-12-28 PROCEDURE — 81001 URINALYSIS AUTO W/SCOPE: CPT | Performed by: FAMILY MEDICINE

## 2021-12-28 RX ORDER — CLINDAMYCIN HCL 300 MG
300 CAPSULE ORAL 3 TIMES DAILY
Qty: 21 CAPSULE | Refills: 0 | Status: SHIPPED | OUTPATIENT
Start: 2021-12-28 | End: 2022-01-04

## 2021-12-28 RX ORDER — CLINDAMYCIN HCL 300 MG
CAPSULE ORAL
Qty: 30 CAPSULE | Refills: 1 | Status: SHIPPED | OUTPATIENT
Start: 2021-12-28 | End: 2022-11-08

## 2021-12-28 NOTE — PROGRESS NOTES
Assessment & Plan      Patient presents to clinic with recurrent vaginal irritation. Wet prep in September was positive for BV but most recent wet prep was negative. She was treated empirically for the visualized discharge and then for the symptoms of a yeast infection that developed.  Today's examination again demonstrates copious discharge and a suspicion for infection but a negative wet prep.  Her UA did demonstrate some small leukocyte esterase, but I suspect it will only grow normal vaginal lola.  Awaiting culture at this time.    With your decision making we chose to treat for 7 days with clindamycin for empiric BV versus vaginal lola overgrowth with an additional prescription of clindamycin the patient can take postcoitally to see if that helps with her symptoms.  We also discussed boric acid or the specialized culture swab given the negative results we have been seeing.    Vaginal irritation  - UA Macro with Reflex to Micro and Culture - lab collect  - Wet prep - Clinic Collect  - Urine Microscopic  - Urine Culture Aerobic Bacterial  - clindamycin (CLEOCIN) 300 MG capsule  Dispense: 21 capsule; Refill: 0  - clindamycin (CLEOCIN) 300 MG capsule  Dispense: 30 capsule; Refill: 1      No follow-ups on file.    Luna Dobson MD  [facility] 26 year [facility] who presents for the following health issues     HPI     Patient has been having intermittent vaginal irritation that seems to be triggered by urination or sex for the past several months.  She is worried she might have a UTI versus recurrent BV.  She has had recent swabs and is frustrated with the burning sensation she experiences.  She has also had recent STD testing which was negative.  She has no new partners.  They intermittently use condoms.  She does not believe she be pregnant because she takes her contraception.    Review of Systems   Endorses: anxiety  Constitutional, HEENT, cardiovascular,  pulmonary, GI, , musculoskeletal, neuro, skin, endocrine and psych systems are negative, except as otherwise noted.      Objective    /72   Pulse 91   Wt 71.2 kg (157 lb)   LMP 12/20/2021   SpO2 99%   BMI 24.35 kg/m    Body mass index is 24.35 kg/m .  Physical Exam   Gen: NAD  : normal external anatomy, normal vaginal mucosa, COPIOUS thick yellowish discharge

## 2021-12-29 LAB — BACTERIA UR CULT: NORMAL

## 2022-02-16 ENCOUNTER — MYC REFILL (OUTPATIENT)
Dept: FAMILY MEDICINE | Facility: CLINIC | Age: 27
End: 2022-02-16
Payer: COMMERCIAL

## 2022-02-16 DIAGNOSIS — Z30.41 ENCOUNTER FOR SURVEILLANCE OF CONTRACEPTIVE PILLS: ICD-10-CM

## 2022-02-18 RX ORDER — NORETHINDRONE ACETATE AND ETHINYL ESTRADIOL .02; 1 MG/1; MG/1
1 TABLET ORAL DAILY
Qty: 84 TABLET | Refills: 3 | Status: SHIPPED | OUTPATIENT
Start: 2022-02-18 | End: 2022-11-30

## 2022-02-18 NOTE — TELEPHONE ENCOUNTER
"Routing refill request to provider for review/approval because:  Due to medication information not transferring due to [facility] please review the medication information prior to signing to ensure accuracy.    Last Written Prescription Date:  11/16/2020  Last Fill Quantity: 84,  # refills: 3   Last office visit provider:  12/28/2021 Dr. Dobson     norethindrone-ethinyl estradiol (JUNEL 1/20, 21,) 1-20 mg-mcg per tablet 84 tablet 3 11/16/2020  No   Sig: TAKE 1 TABLET BY MOUTH EVERY DAY   Sent to pharmacy as: norethindrone acetate 1 mg-ethinyl estradioL 20 mcg tablet (Junel 1/20 (21))   E-Prescribing Status: Receipt confirmed by pharmacy (11/16/2020  2:32 PM CST         Requested Prescriptions   Pending Prescriptions Disp Refills     norethindrone-ethinyl estradiol (MICROGESTIN 1/20) 1-20 MG-MCG tablet 84 tablet 3       Contraceptives Protocol Passed - 2/16/2022  5:42 PM        Passed - Patient is not a current smoker if age is 35 or older        Passed - Recent (12 mo) or future (30 days) visit within the authorizing provider's specialty     Patient has had an office visit with the authorizing provider or a provider within the authorizing providers department within the previous 12 mos or has a future within next 30 days. See \"Patient Info\" tab in inbasket, or \"Choose Columns\" in Meds & Orders section of the refill encounter.              Passed - Medication is active on med list        Passed - No active pregnancy on record        Passed - No positive pregnancy test in past 12 months             Silvana Tirado RN 02/18/22 12:23 PM  "

## 2022-03-21 ENCOUNTER — OFFICE VISIT (OUTPATIENT)
Dept: INTERNAL MEDICINE | Facility: CLINIC | Age: 27
End: 2022-03-21
Payer: COMMERCIAL

## 2022-03-21 VITALS
TEMPERATURE: 97.4 F | WEIGHT: 157 LBS | HEART RATE: 84 BPM | SYSTOLIC BLOOD PRESSURE: 104 MMHG | DIASTOLIC BLOOD PRESSURE: 60 MMHG | RESPIRATION RATE: 14 BRPM | BODY MASS INDEX: 24.64 KG/M2 | OXYGEN SATURATION: 98 % | HEIGHT: 67 IN

## 2022-03-21 DIAGNOSIS — B37.31 YEAST VAGINITIS: Primary | ICD-10-CM

## 2022-03-21 DIAGNOSIS — N89.8 VAGINAL DISCHARGE: ICD-10-CM

## 2022-03-21 LAB
CLUE CELLS: ABNORMAL
TRICHOMONAS, WET PREP: ABNORMAL
WBC'S/HIGH POWER FIELD, WET PREP: ABNORMAL
YEAST, WET PREP: PRESENT

## 2022-03-21 PROCEDURE — 99213 OFFICE O/P EST LOW 20 MIN: CPT | Performed by: PHYSICIAN ASSISTANT

## 2022-03-21 PROCEDURE — 87210 SMEAR WET MOUNT SALINE/INK: CPT | Performed by: PHYSICIAN ASSISTANT

## 2022-03-21 RX ORDER — FLUCONAZOLE 150 MG/1
150 TABLET ORAL DAILY
Qty: 3 TABLET | Refills: 0 | Status: SHIPPED | OUTPATIENT
Start: 2022-03-21 | End: 2022-03-24

## 2022-03-21 ASSESSMENT — ASTHMA QUESTIONNAIRES: ACT_TOTALSCORE: 24

## 2022-03-21 NOTE — PROGRESS NOTES
"  Assessment & Plan     Yeast vaginitis    - fluconazole (DIFLUCAN) 150 MG tablet; Take 1 tablet (150 mg) by mouth daily for 3 days    Vaginal discharge    - Wet prep - Clinic Collect             Issues with recurrent vaginitis. States one time diflucan has not help with yeast infections in the past.  Will do 3 day course this time.   Recheck prn     Return in about 3 months (around 6/21/2022) for Physical Exam, regular primary provider.    CURTIS Fontaine [facility] JOHN Whitney is a 27 year old who presents for the following health issues     Vaginal Problem     Female Gu Problem       Vaginal Symptoms  Onset/Duration: 5-7 days   Description:  Vaginal Discharge: Thick, green/ yellow    Itching (Pruritis): no  Burning sensation:  YES  Odor: YES- slight   Accompanying Signs & Symptoms:  Urinary symptoms: YES- pain when urinating   Abdominal pain: Pelvic cramping   Fever: no  History:   Sexually active: YES  New Partner: no  Possibility of Pregnancy:  YES  Recent antibiotic use: YES, recently finished amoxicillin for strep throat - in March -  PCN- in January for strep  Previous vaginitis issues: YES  Precipitating or alleviating factors: Abx   Therapies tried and outcome: none          Review of Systems   Genitourinary: Positive for vaginal discharge.      Constitutional, HEENT, cardiovascular, pulmonary, gi and gu systems are negative, except as otherwise noted.      Objective    /60   Pulse 84   Temp 97.4  F (36.3  C) (Temporal)   Resp 14   Ht 1.708 m (5' 7.25\")   Wt 71.2 kg (157 lb)   LMP 02/28/2022 (Approximate)   SpO2 98%   Breastfeeding No   BMI 24.41 kg/m    Body mass index is 24.41 kg/m .  Physical Exam   GENERAL: healthy, alert and no distress  ABDOMEN: soft, nontender, no hepatosplenomegaly, no masses and bowel sounds normal   (female): normal female external genitalia, normal urethral meatus , vaginal mucosa pink, moist, well rugated " and vaginal discharge - copious, white and yellow    Results for orders placed or performed in visit on 03/21/22 (from the past 24 hour(s))   Wet prep - Clinic Collect    Specimen: Vagina; Swab   Result Value Ref Range    Trichomonas Absent Absent    Yeast Present (A) Absent    Clue Cells Absent Absent    WBCs/high power field 4+ (A) None               Answers for HPI/ROS submitted by the patient on 3/21/2022  Discharge characteristics: copious, green, thick, yellow  Passing clots?: No  Passing tissue?: No

## 2022-05-28 ENCOUNTER — HEALTH MAINTENANCE LETTER (OUTPATIENT)
Age: 27
End: 2022-05-28

## 2022-09-02 ENCOUNTER — OFFICE VISIT (OUTPATIENT)
Dept: INTERNAL MEDICINE | Facility: CLINIC | Age: 27
End: 2022-09-02
Payer: COMMERCIAL

## 2022-09-02 VITALS
WEIGHT: 160.9 LBS | TEMPERATURE: 99.1 F | OXYGEN SATURATION: 98 % | HEART RATE: 84 BPM | SYSTOLIC BLOOD PRESSURE: 110 MMHG | BODY MASS INDEX: 25.01 KG/M2 | DIASTOLIC BLOOD PRESSURE: 70 MMHG

## 2022-09-02 DIAGNOSIS — N89.8 VAGINAL DISCHARGE: Primary | ICD-10-CM

## 2022-09-02 PROCEDURE — 87591 N.GONORRHOEAE DNA AMP PROB: CPT | Performed by: PHYSICIAN ASSISTANT

## 2022-09-02 PROCEDURE — 87210 SMEAR WET MOUNT SALINE/INK: CPT | Performed by: PHYSICIAN ASSISTANT

## 2022-09-02 PROCEDURE — 87491 CHLMYD TRACH DNA AMP PROBE: CPT | Performed by: PHYSICIAN ASSISTANT

## 2022-09-02 PROCEDURE — 99213 OFFICE O/P EST LOW 20 MIN: CPT | Performed by: PHYSICIAN ASSISTANT

## 2022-09-02 ASSESSMENT — ASTHMA QUESTIONNAIRES
ACT_TOTALSCORE: 24
QUESTION_1 LAST FOUR WEEKS HOW MUCH OF THE TIME DID YOUR ASTHMA KEEP YOU FROM GETTING AS MUCH DONE AT WORK, SCHOOL OR AT HOME: NONE OF THE TIME
ACT_TOTALSCORE: 24
QUESTION_3 LAST FOUR WEEKS HOW OFTEN DID YOUR ASTHMA SYMPTOMS (WHEEZING, COUGHING, SHORTNESS OF BREATH, CHEST TIGHTNESS OR PAIN) WAKE YOU UP AT NIGHT OR EARLIER THAN USUAL IN THE MORNING: NOT AT ALL
QUESTION_4 LAST FOUR WEEKS HOW OFTEN HAVE YOU USED YOUR RESCUE INHALER OR NEBULIZER MEDICATION (SUCH AS ALBUTEROL): ONCE A WEEK OR LESS
QUESTION_5 LAST FOUR WEEKS HOW WOULD YOU RATE YOUR ASTHMA CONTROL: COMPLETELY CONTROLLED
QUESTION_2 LAST FOUR WEEKS HOW OFTEN HAVE YOU HAD SHORTNESS OF BREATH: NOT AT ALL

## 2022-09-02 ASSESSMENT — PATIENT HEALTH QUESTIONNAIRE - PHQ9
10. IF YOU CHECKED OFF ANY PROBLEMS, HOW DIFFICULT HAVE THESE PROBLEMS MADE IT FOR YOU TO DO YOUR WORK, TAKE CARE OF THINGS AT HOME, OR GET ALONG WITH OTHER PEOPLE: SOMEWHAT DIFFICULT
SUM OF ALL RESPONSES TO PHQ QUESTIONS 1-9: 6
SUM OF ALL RESPONSES TO PHQ QUESTIONS 1-9: 6

## 2022-09-02 NOTE — PROGRESS NOTES
Assessment & Plan     Vaginal discharge    - Wet prep - Clinic Collect  - Chlamydia trachomatis PCR - Clinic Collect  - Neisseria gonorrhoeae PCR             Will notify of results via my chart     Return in about 3 weeks (around 9/23/2022) for recheck if not improving, regular primary provider.    CURTIS Fontaine [facility] JOHN Whitney is a 27 year old, presenting for the following health issues:  Vaginal Problem      Vaginal Problem     History of Present Illness       Reason for visit:  Possible yeast infection or BV  Symptom onset:  3-7 days ago  Symptom intensity:  Moderate  Symptom progression:  Staying the same  Had these symptoms before:  Yes  Has tried/received treatment for these symptoms:  Yes  Previous treatment was successful:  Yes  Prior treatment description:  Antibiotics    She eats 2-3 servings of fruits and vegetables daily.She consumes 1 sweetened beverage(s) daily.She exercises with enough effort to increase her heart rate 30 to 60 minutes per day.  She exercises with enough effort to increase her heart rate 5 days per week.   She is taking medications regularly.    Today's PHQ-9         PHQ-9 Total Score: 6    PHQ-9 Q9 Thoughts of better off dead/self-harm past 2 weeks :   Not at all    How difficult have these problems made it for you to do your work, take care of things at home, or get along with other people: Somewhat difficult       Vaginal Symptoms  Description:  Vaginal Discharge: creamy greenish   Itching (Pruritis): No  Burning sensation:  No  Odor: YES  Accompanying Signs & Symptoms:  Urinary symptoms: No  Abdominal pain: YES slightly previously not  Now  Fever: No  History:   Sexually active: Not currently  New Partner: No  Possibility of Pregnancy:  No  Recent antibiotic use: No  Previous vaginitis issues: YES  Therapies tried and outcome: none          Review of Systems   Genitourinary: Positive for vaginal discharge.       Constitutional, HEENT, cardiovascular, pulmonary, gi and gu systems are negative, except as otherwise noted.      Objective    /70   Pulse 84   Temp 99.1  F (37.3  C) (Tympanic)   Wt 73 kg (160 lb 14.4 oz)   LMP 08/15/2022   SpO2 98%   Breastfeeding No   BMI 25.01 kg/m    Body mass index is 25.01 kg/m .  Physical Exam   GENERAL: healthy, alert and no distress  RESP: lungs clear to auscultation - no rales, rhonchi or wheezes  CV: regular rates and rhythm and normal S1 S2, no S3 or S4   (female): normal female external genitalia, normal urethral meatus , vaginal mucosa pink, moist, well rugated and vaginal discharge - moderate, white and yellow  MS: no gross musculoskeletal defects noted, no edema    Results for orders placed or performed in visit on 09/02/22 (from the past 24 hour(s))   Wet prep - Clinic Collect    Specimen: Vagina; Swab   Result Value Ref Range    Trichomonas Absent Absent    Yeast Absent Absent    Clue Cells Absent Absent    WBCs/high power field 3+ (A) None

## 2022-09-03 LAB
C TRACH DNA SPEC QL NAA+PROBE: NEGATIVE
N GONORRHOEA DNA SPEC QL NAA+PROBE: NEGATIVE

## 2022-10-01 ENCOUNTER — HEALTH MAINTENANCE LETTER (OUTPATIENT)
Age: 27
End: 2022-10-01

## 2022-10-23 ENCOUNTER — MYC REFILL (OUTPATIENT)
Dept: FAMILY MEDICINE | Facility: CLINIC | Age: 27
End: 2022-10-23

## 2022-10-23 DIAGNOSIS — R06.2 WHEEZING: ICD-10-CM

## 2022-10-24 RX ORDER — ALBUTEROL SULFATE 90 UG/1
2 AEROSOL, METERED RESPIRATORY (INHALATION) EVERY 4 HOURS PRN
Qty: 18 G | Refills: 11 | Status: SHIPPED | OUTPATIENT
Start: 2022-10-24

## 2022-10-25 NOTE — TELEPHONE ENCOUNTER
"Routing refill request to provider for review/approval because:  Patient needs to be seen because:  q6m,    Last Written Prescription Date:  10/21/2020  Last Fill Quantity: 1,  # refills: 3   Last office visit provider:  9/10/21     Requested Prescriptions   Pending Prescriptions Disp Refills     albuterol (PROAIR HFA/PROVENTIL HFA/VENTOLIN HFA) 108 (90 Base) MCG/ACT inhaler       Sig: Inhale 2 puffs into the lungs every 4 hours as needed       Asthma Maintenance Inhalers - Anticholinergics Failed - 10/23/2022 11:11 AM        Failed - Recent (6 mo) or future (30 days) visit within the authorizing provider's specialty     Patient had office visit in the last 6 months or has a visit in the next 30 days with authorizing provider or within the authorizing provider's specialty.  See \"Patient Info\" tab in inbasket, or \"Choose Columns\" in Meds & Orders section of the refill encounter.            Passed - Patient is age 12 years or older        Passed - Asthma control assessment score within normal limits in last 6 months     Please review ACT score.           Passed - Medication is active on med list       Short-Acting Beta Agonist Inhalers Protocol  Failed - 10/23/2022 11:11 AM        Failed - Recent (6 mo) or future (30 days) visit within the authorizing provider's specialty     Patient had office visit in the last 6 months or has a visit in the next 30 days with authorizing provider or within the authorizing provider's specialty.  See \"Patient Info\" tab in inbasket, or \"Choose Columns\" in Meds & Orders section of the refill encounter.            Passed - Patient is age 12 or older        Passed - Asthma control assessment score within normal limits in last 6 months     Please review ACT score.           Passed - Medication is active on med list             Neeta Bland RN 10/24/22 7:33 PM  "

## 2022-11-08 ENCOUNTER — OFFICE VISIT (OUTPATIENT)
Dept: INTERNAL MEDICINE | Facility: CLINIC | Age: 27
End: 2022-11-08
Payer: COMMERCIAL

## 2022-11-08 VITALS
OXYGEN SATURATION: 98 % | TEMPERATURE: 97.9 F | RESPIRATION RATE: 16 BRPM | BODY MASS INDEX: 24.76 KG/M2 | DIASTOLIC BLOOD PRESSURE: 72 MMHG | HEART RATE: 90 BPM | WEIGHT: 159.3 LBS | SYSTOLIC BLOOD PRESSURE: 112 MMHG

## 2022-11-08 DIAGNOSIS — N89.8 VAGINAL DISCHARGE: ICD-10-CM

## 2022-11-08 DIAGNOSIS — N76.0 BACTERIAL VAGINOSIS: Primary | ICD-10-CM

## 2022-11-08 DIAGNOSIS — B96.89 BACTERIAL VAGINOSIS: Primary | ICD-10-CM

## 2022-11-08 LAB
CLUE CELLS: PRESENT
TRICHOMONAS, WET PREP: ABNORMAL
WBC'S/HIGH POWER FIELD, WET PREP: ABNORMAL
YEAST, WET PREP: ABNORMAL

## 2022-11-08 PROCEDURE — 87591 N.GONORRHOEAE DNA AMP PROB: CPT | Performed by: PHYSICIAN ASSISTANT

## 2022-11-08 PROCEDURE — 87491 CHLMYD TRACH DNA AMP PROBE: CPT | Performed by: PHYSICIAN ASSISTANT

## 2022-11-08 PROCEDURE — 99213 OFFICE O/P EST LOW 20 MIN: CPT | Performed by: PHYSICIAN ASSISTANT

## 2022-11-08 PROCEDURE — 87210 SMEAR WET MOUNT SALINE/INK: CPT | Performed by: PHYSICIAN ASSISTANT

## 2022-11-08 RX ORDER — METRONIDAZOLE 500 MG/1
500 TABLET ORAL 2 TIMES DAILY
Qty: 14 TABLET | Refills: 0 | Status: SHIPPED | OUTPATIENT
Start: 2022-11-08 | End: 2022-11-15

## 2022-11-08 ASSESSMENT — PATIENT HEALTH QUESTIONNAIRE - PHQ9
SUM OF ALL RESPONSES TO PHQ QUESTIONS 1-9: 4
10. IF YOU CHECKED OFF ANY PROBLEMS, HOW DIFFICULT HAVE THESE PROBLEMS MADE IT FOR YOU TO DO YOUR WORK, TAKE CARE OF THINGS AT HOME, OR GET ALONG WITH OTHER PEOPLE: SOMEWHAT DIFFICULT
SUM OF ALL RESPONSES TO PHQ QUESTIONS 1-9: 4

## 2022-11-08 NOTE — PROGRESS NOTES
Assessment & Plan     Bacterial vaginosis    - metroNIDAZOLE (FLAGYL) 500 MG tablet; Take 1 tablet (500 mg) by mouth 2 times daily for 7 days    Vaginal discharge    - Wet prep - Clinic Collect  - Chlamydia trachomatis PCR - Clinic Collect  - Neisseria gonorrhoeae PCR             mychart results     Return in about 4 weeks (around 12/6/2022) for Physical Exam, regular primary provider.    CURTIS Fontaine [facility] JOHN Whitney is a 27 year old, presenting for the following health issues:  Vaginal Discharge      History of Present Illness       Reason for visit:  Discharge  Symptom onset:  3-7 days ago  Symptoms include:  Burning on outisde area, itching, Discharge, Sexual active, New partner   Symptom intensity:  Moderate  Symptom progression:  Staying the same    She eats 2-3 servings of fruits and vegetables daily.She consumes 1 sweetened beverage(s) daily.She exercises with enough effort to increase her heart rate 30 to 60 minutes per day.  She exercises with enough effort to increase her heart rate 4 days per week.   She is taking medications regularly.    Today's PHQ-9         PHQ-9 Total Score: 4    PHQ-9 Q9 Thoughts of better off dead/self-harm past 2 weeks :   Not at all    How difficult have these problems made it for you to do your work, take care of things at home, or get along with other people: Somewhat difficult             Review of Systems   Constitutional, HEENT, cardiovascular, pulmonary, gi and gu systems are negative, except as otherwise noted.      Objective    /72   Pulse 90   Temp 97.9  F (36.6  C) (Tympanic)   Resp 16   Wt 72.3 kg (159 lb 4.8 oz)   LMP 10/11/2022   SpO2 98%   BMI 24.76 kg/m    Body mass index is 24.76 kg/m .  Physical Exam   GENERAL: healthy, alert and no distress  ABDOMEN: soft, nontender, no hepatosplenomegaly, no masses and bowel sounds normal   (female): normal female external genitalia, normal  urethral meatus  and vaginal discharge - moderate, yellow and green  MS: no gross musculoskeletal defects noted, no edema    Results for orders placed or performed in visit on 11/08/22 (from the past 24 hour(s))   Wet prep - Clinic Collect    Specimen: Vagina; Swab   Result Value Ref Range    Trichomonas Absent Absent    Yeast Absent Absent    Clue Cells Present (A) Absent    WBCs/high power field 1+ (A) None

## 2022-11-09 LAB
C TRACH DNA SPEC QL NAA+PROBE: NEGATIVE
N GONORRHOEA DNA SPEC QL NAA+PROBE: NEGATIVE

## 2022-11-21 ENCOUNTER — OFFICE VISIT (OUTPATIENT)
Dept: FAMILY MEDICINE | Facility: CLINIC | Age: 27
End: 2022-11-21
Payer: COMMERCIAL

## 2022-11-21 ENCOUNTER — NURSE TRIAGE (OUTPATIENT)
Dept: FAMILY MEDICINE | Facility: CLINIC | Age: 27
End: 2022-11-21

## 2022-11-21 VITALS
TEMPERATURE: 98.1 F | OXYGEN SATURATION: 99 % | DIASTOLIC BLOOD PRESSURE: 78 MMHG | WEIGHT: 153.4 LBS | BODY MASS INDEX: 23.25 KG/M2 | HEIGHT: 68 IN | SYSTOLIC BLOOD PRESSURE: 119 MMHG | HEART RATE: 97 BPM | RESPIRATION RATE: 14 BRPM

## 2022-11-21 DIAGNOSIS — R10.9 ABDOMINAL CRAMPING: ICD-10-CM

## 2022-11-21 DIAGNOSIS — Z32.01 PREGNANCY TEST POSITIVE: Primary | ICD-10-CM

## 2022-11-21 LAB — HCG SERPL QL: POSITIVE

## 2022-11-21 PROCEDURE — 36415 COLL VENOUS BLD VENIPUNCTURE: CPT | Performed by: PHYSICIAN ASSISTANT

## 2022-11-21 PROCEDURE — 84703 CHORIONIC GONADOTROPIN ASSAY: CPT | Performed by: PHYSICIAN ASSISTANT

## 2022-11-21 PROCEDURE — 99213 OFFICE O/P EST LOW 20 MIN: CPT | Performed by: PHYSICIAN ASSISTANT

## 2022-11-21 PROCEDURE — 84702 CHORIONIC GONADOTROPIN TEST: CPT | Performed by: PHYSICIAN ASSISTANT

## 2022-11-21 ASSESSMENT — PAIN SCALES - GENERAL: PAINLEVEL: NO PAIN (0)

## 2022-11-21 NOTE — PATIENT INSTRUCTIONS
At [facility], we strive to deliver an exceptional experience to you, every time we see you. If you receive a survey, please complete it as we do value your feedback.  If you have MyChart, you can expect to receive results automatically within 24 hours of their completion.  Your provider will send a note interpreting your results as well.   If you do not have MyChart, you should receive your results in about a week by mail.    Your care team:                            Family Medicine Internal Medicine   MD Britton Arellano MD Shantel Branch-Fleming, MD Srinivasa Vaka, MD Katya Belousova, PATEO Soliz CNP, MD ([facility]   Bam Guerra, MD Anitha Alvarez MD Amelia Massimini APRN RAFFI Rutledge APRN MD Gill Garcia MD          Clinic hours: Monday - Thursday 7 am-6 pm; Fridays 7 am-5 pm.   Urgent care: Monday - Friday 10 am- 8 pm; Saturday and Sunday 9 am-5 pm.    Clinic: [phone]       [facility]: Monday - Thursday 8 am - 7 pm; Friday 8 am - 6 pm  [facility]: [phone]

## 2022-11-21 NOTE — PROGRESS NOTES
"  Assessment & Plan  benign exam, patient ok deferring wet prep today as discharge resolved and current symptoms likely related to early pregnancy, labs to confirm progression of pregnancy given early date since LMP. ED precautions discussed  Problem List Items Addressed This Visit    None  Visit Diagnoses     Pregnancy test positive    -  Primary    Relevant Orders    HCG quantitative pregnancy    hCG Qualitative Pregnancy    Abdominal cramping        Relevant Orders    HCG quantitative pregnancy    hCG Qualitative Pregnancy                25 minutes spent on the date of the encounter doing chart review, history and exam, documentation and further activities per the note  {   Return in about 2 days (around 11/23/2022) for Lab Work.    [facility]AINE Amanda  [facility] is a 27 year old accompanied by her boyfriend, presenting for the following health issues:  Vaginal Problem (BV or yeast ) and Ultrasound (Cramping )      HPI     Vaginal Symptoms  Onset/Duration: 2-4 days   Description:  Vaginal Discharge: white clear   Itching (Pruritis): No  Burning sensation:  No  Odor: No  Accompanying Signs & Symptoms:  Urinary symptoms: No  Abdominal pain: Cramping , on and off, mil dot moderate, no vaginal bleeding  Fever: No  History:   Sexually active: YES  New Partner: No  Possibility of Pregnancy:  YES, 3 home pregnancy tests,   Recent antibiotic use: YES  Previous vaginitis issues: YES.,tx 'd with flagyl 11/8, and generally improved,  during tx was found to be pregnant, , estimated from LMP to be at 6 weeks,   Precipitating or alleviating factors: None  Therapies tried and outcome: none      Has OB appt scheduled     Review of Systems   OB symptoms as above      Objective    /78 (BP Location: Left arm, Patient Position: Sitting, Cuff Size: Adult Regular)   Pulse 97   Temp 98.1  F (36.7  C) (Oral)   Resp 14   Ht 1.715 m (5' 7.5\")   Wt 69.6 kg (153 lb 6.4 " oz)   LMP 10/10/2022 (Exact Date)   SpO2 99%   BMI 23.67 kg/m    Body mass index is 23.67 kg/m .  Physical Exam   GENERAL: healthy, alert and no distress  ABDOMEN: soft, nontender, no hepatosplenomegaly, no masses and bowel sounds normal

## 2022-11-21 NOTE — TELEPHONE ENCOUNTER
Spoke with patient.  She have been having abdominal cramping for last 1-1.5 weeks.  6 weeks along today.   LMP: 10/10/2022  2 weeks was seen and given antibiotic for BV.  No vaginal bleeding.  Cramping is higher up in the uterus. Pain is mild and is still able to do normal activities.     No other symptoms.     She does have a follow up appointment today,  Advised to keep visit.  Will route to provider as FYI for visit today.      Chong Benjamin, DILEEPN, RN, PHN  [facility] ~ Registered Nurse  Clinic Triage ~ [place] [facility]  November 21, 2022      Reason for Disposition    MILD abdominal pain (e.g., doesn't interfere with normal activities)    Additional Information    Negative: Passed out (i.e., fainted, collapsed and was not responding)    Negative: Shock suspected (e.g., cold/pale/clammy skin, too weak to stand, low BP, rapid pulse)    Negative: Difficult to awaken or acting confused (e.g., disoriented, slurred speech)    Negative: Sounds like a life-threatening emergency to the triager    Negative: Abdominal pain and pregnant 20 or more weeks    Negative: MODERATE-SEVERE abdominal pain    Negative: SEVERE vaginal bleeding (e.g., soaking 2 pads or tampons per hour and present 2 or more hours; 1 menstrual cup every 2 hours)    Negative: MODERATE vaginal bleeding (e.g., soaking 1 pad or tampon per hour and present > 6 hours; 1 menstrual cup every 6 hours)    Negative: MODERATE vaginal bleeding and pregnant > 12 weeks    Negative: Passed tissue (e.g., gray-white)    Negative: Vomiting and contains red blood or black ('coffee ground') material    Negative: Shoulder pain    Negative: Upper abdominal pains and radiate into chest, or sour taste in mouth    Negative: Upper abdominal pains and occur regularly about 1 hour after meals    Negative: Abdominal pain is a chronic symptom (recurrent or ongoing AND lasting > 4 weeks)    Negative: Not feeling pregnant any longer (e.g., breast tenderness or nausea has  disappeared)    Negative: Prior history of 'ectopic pregnancy' or previous tubal surgery (e.g., tubal ligation)    Negative: Has IUD    Negative: Unusual vaginal discharge (e.g., odorous, yellow, green, or foamy-white)    Negative: Pain or burning with passing urine (urination)    Negative: Blood in urine (red, pink, or tea-colored)    Negative: Patient wants to be seen    Negative: Fever > 100.4 F (38.0 C)    Negative: MILD constant abdominal pain (e.g., doesn't interfere with normal activities) and present > 2 hours    Negative: Intermittent lower abdominal pain lasting > 24 hours    Negative: Vaginal bleeding or spotting    Negative: White of the eyes have turned yellow (i.e., jaundice)    Negative: Lightheadedness or dizziness    Negative: Patient sounds very sick or weak to the triager    Protocols used: PREGNANCY - ABDOMINAL PAIN LESS THAN 20 WEEKS EGA-A-OH

## 2022-11-22 LAB — B-HCG SERPL-ACNC: ABNORMAL IU/L (ref 0–5)

## 2022-11-25 ENCOUNTER — LAB (OUTPATIENT)
Dept: LAB | Facility: CLINIC | Age: 27
End: 2022-11-25
Payer: COMMERCIAL

## 2022-11-25 DIAGNOSIS — Z32.01 PREGNANCY TEST POSITIVE: ICD-10-CM

## 2022-11-25 DIAGNOSIS — R10.9 ABDOMINAL CRAMPING: ICD-10-CM

## 2022-11-25 LAB — B-HCG SERPL-ACNC: ABNORMAL IU/L (ref 0–5)

## 2022-11-25 PROCEDURE — 84702 CHORIONIC GONADOTROPIN TEST: CPT

## 2022-11-25 PROCEDURE — 36415 COLL VENOUS BLD VENIPUNCTURE: CPT

## 2022-11-29 ENCOUNTER — PRENATAL OFFICE VISIT (OUTPATIENT)
Dept: OBGYN | Facility: CLINIC | Age: 27
End: 2022-11-29
Payer: COMMERCIAL

## 2022-11-29 DIAGNOSIS — Z34.00 SUPERVISION OF NORMAL FIRST PREGNANCY: Primary | ICD-10-CM

## 2022-11-29 DIAGNOSIS — Z23 NEED FOR TDAP VACCINATION: ICD-10-CM

## 2022-11-29 PROCEDURE — 99207 PR NO CHARGE NURSE ONLY: CPT

## 2022-11-29 RX ORDER — PRENATAL VIT/IRON FUM/FOLIC AC 27MG-0.8MG
1 TABLET ORAL DAILY
COMMUNITY

## 2022-11-29 NOTE — PROGRESS NOTES
Telephone visit with patient for New Prenatal Intake and Education. This is patient's first pregnancy. Handouts reviewed and will be provided at next prenatal appointment. Scheduled for New Prenatal with Dr. Agbeh on 12/20/2022.       Prenatal OB Questionnaire  Patient supplied answers from flow sheet for:  Prenatal OB Questionnaire.  Past Medical History  Have you ever recieved care for your mental health? : (!) Yes (in highschool, no current concerns)  Have you ever been in a major accident or suffered serious trauma?: No  Within the last year, has anyone hit, slapped, kicked or otherwise hurt you?: No  In the last year, has anyone forced you to have sex when you didn't want to?: No    Past Medical History 2   Have you ever received a blood transfusion?: No  Would you accept a blood transfusion if was medically recommended?: Yes  Does anyone in your home smoke?: No   Is your blood type Rh negative?: Unknown  Have you ever ?: No  Have you been hospitalized for a nonsurgical reason excluding normal delivery?: No  Have you ever had an abnormal pap smear?: No    Past Medical History (Continued)  Do you have a history of abnormalities of the uterus?: No  Did your mother take LUZ or any other hormones when she was pregnant with you?: No  Do you have any other problems we have not asked about which you feel may be important to this pregnancy?: No    PHQ-2 Score:     PHQ-2 ( 1999 Pf[employer]) 11/29/2022 11/29/2021   Q1: Little interest or pleasure in doing things 0 1   Q2: Feeling down, depressed or hopeless 1 0   PHQ-2 Score 1 1   PHQ-2 Total Score (12-17 Years)- Positive if 3 or more points; Administer PHQ-A if positive - -   Q1: Little interest or pleasure in doing things - Several days   Q2: Feeling down, depressed or hopeless - Not at all   PHQ-2 Score - 1       Allergies as of 11/29/2022:    Allergies as of 11/29/2022 - Reviewed 11/29/2022   Allergen Reaction Noted     Latex  11/29/2022       Current medications  are:  Current Outpatient Medications   Medication Sig Dispense Refill     Prenatal Vit-Fe Fumarate-FA (PRENATAL MULTIVITAMIN W/IRON) 27-0.8 MG tablet Take 1 tablet by mouth daily       albuterol (PROAIR HFA/PROVENTIL HFA/VENTOLIN HFA) 108 (90 Base) MCG/ACT inhaler Inhale 2 puffs into the lungs every 4 hours as needed for shortness of breath / dyspnea or wheezing (Patient not taking: Reported on 11/29/2022) 18 g 11     norethindrone-ethinyl estradiol (MICROGESTIN 1/20) 1-20 MG-MCG tablet Take 1 tablet by mouth daily [NORETHINDRONE-ETHINYL ESTRADIOL (JUNEL 1/20, 21,) 1-20 MG-MCG PER TABLET] TAKE 1 TABLET BY MOUTH EVERY DAY (Patient not taking: Reported on 9/2/2022) 84 tablet 3     Martha Moody RN on 11/29/2022 at 2:22 PM

## 2022-11-30 ENCOUNTER — TELEPHONE (OUTPATIENT)
Dept: OBGYN | Facility: CLINIC | Age: 27
End: 2022-11-30

## 2022-11-30 ENCOUNTER — VIRTUAL VISIT (OUTPATIENT)
Dept: FAMILY MEDICINE | Facility: CLINIC | Age: 27
End: 2022-11-30
Payer: COMMERCIAL

## 2022-11-30 DIAGNOSIS — U07.1 CLINICAL DIAGNOSIS OF COVID-19: Primary | ICD-10-CM

## 2022-11-30 PROCEDURE — 99213 OFFICE O/P EST LOW 20 MIN: CPT | Mod: CS | Performed by: FAMILY MEDICINE

## 2022-11-30 NOTE — PROGRESS NOTES
Chelo is a 27 year old who is being evaluated via a billable video visit.      How would you like to obtain your AVS? MyChart  If the video visit is dropped, the invitation should be resent by: Text to cell phone: [phone]  Will anyone else be joining your video visit? No          Assessment & Plan     Clinical diagnosis of COVID-19  Patient with COVID-19.  At risk for more severe disease given pregnancy and history of lung disease, so appropriate to treat with Paxlovid. Reviewed with patient the limitations of safety data but that was approved by [employer] for use in pregnant patient. Prior kidney function test is within normal limits although noted that has not been done recently. However, patient is young without risks of kidney issues.  Reviewed potential side effects and drug interactions.  Also discussed Paxlovid rebound.  Discussed symptoms of worsening that should lead to follow up for reevaluation.  Patient denies further questions.    - nirmatrelvir and ritonavir (PAXLOVID) therapy pack; Take 3 tablets by mouth 2 times daily for 5 days (Take 2 Nirmatrelvir tablets and 1 Ritonavir tablet twice daily for 5 days)                 No follow-ups on file.    Lotus Bejarano MD  [facility]    Subjective   Chelo is a 27 year old, presenting for the following health issues:  Covid Concern (Currently 7 weeks pregnant, home test positive for COVID, having cough, low grade fever, HA, nasal congestion, sx began 11/28/22)      HPI       COVID-19 Symptom Review  How many days ago did these symptoms start? 11/28/22    Are any of the following symptoms significant for you?    New or worsening difficulty breathing? No    Worsening cough? Yes, I am coughing up mucus.    Fever or chills? Yes, the highest temperature was 100.9    Headache: YES    Sore throat: YES    Chest pain: No    Diarrhea: No    Body aches? YES    What treatments has patient tried? Guaifenesin (mucinex) and Acetaminophen    Does patient live in a nursing home, group home, or shelter? No  Does patient have a way to get food/medications during quarantined? Yes, I have a friend or family member who can help me.                  Review of Systems         Objective           Vitals:  No vitals were obtained today due to virtual visit.    Physical Exam   GENERAL: Healthy, alert and no distress  EYES: Eyes grossly normal to inspection.  No discharge or erythema, or obvious scleral/conjunctival abnormalities.  RESP: No audible wheeze, cough, or visible cyanosis.  No visible retractions or increased work of breathing.    SKIN: Visible skin clear. No significant rash, abnormal pigmentation or lesions.  NEURO: Cranial nerves grossly intact.  Mentation and speech appropriate for age.  PSYCH: Mentation appears normal, affect normal/bright, judgement and insight intact, normal speech and appearance well-groomed.                Video-Visit Details    Video Start Time: 2:41 PM    Type of service:  Video Visit    Video End Time:2:51 PM    Originating Location (pt. Location): Home        Distant Location (provider location):  On-site    Platform used for Video Visit: Rosemary

## 2022-12-09 ENCOUNTER — ANCILLARY PROCEDURE (OUTPATIENT)
Dept: ULTRASOUND IMAGING | Facility: CLINIC | Age: 27
End: 2022-12-09
Attending: OBSTETRICS & GYNECOLOGY
Payer: COMMERCIAL

## 2022-12-09 DIAGNOSIS — Z34.00 SUPERVISION OF NORMAL FIRST PREGNANCY: ICD-10-CM

## 2022-12-09 PROCEDURE — 76801 OB US < 14 WKS SINGLE FETUS: CPT | Performed by: RADIOLOGY

## 2022-12-09 PROCEDURE — 76817 TRANSVAGINAL US OBSTETRIC: CPT | Performed by: RADIOLOGY

## 2022-12-13 ENCOUNTER — TELEPHONE (OUTPATIENT)
Dept: OBGYN | Facility: CLINIC | Age: 27
End: 2022-12-13

## 2022-12-19 LAB
ABO/RH(D): NORMAL
ANTIBODY SCREEN: NEGATIVE
SPECIMEN EXPIRATION DATE: NORMAL

## 2022-12-20 ENCOUNTER — TRANSCRIBE ORDERS (OUTPATIENT)
Dept: MATERNAL FETAL MEDICINE | Facility: CLINIC | Age: 27
End: 2022-12-20

## 2022-12-20 ENCOUNTER — PRENATAL OFFICE VISIT (OUTPATIENT)
Dept: OBGYN | Facility: CLINIC | Age: 27
End: 2022-12-20
Payer: COMMERCIAL

## 2022-12-20 VITALS
BODY MASS INDEX: 23.76 KG/M2 | SYSTOLIC BLOOD PRESSURE: 107 MMHG | DIASTOLIC BLOOD PRESSURE: 60 MMHG | HEART RATE: 77 BPM | WEIGHT: 154 LBS

## 2022-12-20 DIAGNOSIS — Z34.00 SUPERVISION OF NORMAL FIRST PREGNANCY, ANTEPARTUM: ICD-10-CM

## 2022-12-20 DIAGNOSIS — O09.91 SUPERVISION OF HIGH RISK PREGNANCY IN FIRST TRIMESTER: Primary | ICD-10-CM

## 2022-12-20 DIAGNOSIS — O26.90 PREGNANCY RELATED CONDITION, ANTEPARTUM: Primary | ICD-10-CM

## 2022-12-20 LAB
ALBUMIN UR-MCNC: NEGATIVE MG/DL
APPEARANCE UR: CLEAR
BILIRUB UR QL STRIP: NEGATIVE
COLOR UR AUTO: ABNORMAL
ERYTHROCYTE [DISTWIDTH] IN BLOOD BY AUTOMATED COUNT: 15.6 % (ref 10–15)
GLUCOSE UR STRIP-MCNC: NEGATIVE MG/DL
HBV SURFACE AG SERPL QL IA: NONREACTIVE
HCT VFR BLD AUTO: 36.4 % (ref 35–47)
HCV AB SERPL QL IA: NONREACTIVE
HGB BLD-MCNC: 12 G/DL (ref 11.7–15.7)
HGB UR QL STRIP: ABNORMAL
HIV 1+2 AB+HIV1 P24 AG SERPL QL IA: NONREACTIVE
KETONES UR STRIP-MCNC: NEGATIVE MG/DL
LEUKOCYTE ESTERASE UR QL STRIP: NEGATIVE
MCH RBC QN AUTO: 29.1 PG (ref 26.5–33)
MCHC RBC AUTO-ENTMCNC: 33 G/DL (ref 31.5–36.5)
MCV RBC AUTO: 88 FL (ref 78–100)
NITRATE UR QL: NEGATIVE
PH UR STRIP: 6.5 [PH] (ref 5–7)
PLATELET # BLD AUTO: 245 10E3/UL (ref 150–450)
RBC # BLD AUTO: 4.13 10E6/UL (ref 3.8–5.2)
RBC #/AREA URNS AUTO: NORMAL /HPF
SP GR UR STRIP: 1.02 (ref 1–1.03)
UROBILINOGEN UR STRIP-MCNC: NORMAL MG/DL
WBC # BLD AUTO: 6.5 10E3/UL (ref 4–11)
WBC #/AREA URNS AUTO: NORMAL /HPF

## 2022-12-20 PROCEDURE — 87389 HIV-1 AG W/HIV-1&-2 AB AG IA: CPT | Performed by: OBSTETRICS & GYNECOLOGY

## 2022-12-20 PROCEDURE — G0145 SCR C/V CYTO,THINLAYER,RESCR: HCPCS | Performed by: OBSTETRICS & GYNECOLOGY

## 2022-12-20 PROCEDURE — 86762 RUBELLA ANTIBODY: CPT | Performed by: OBSTETRICS & GYNECOLOGY

## 2022-12-20 PROCEDURE — 87086 URINE CULTURE/COLONY COUNT: CPT | Performed by: OBSTETRICS & GYNECOLOGY

## 2022-12-20 PROCEDURE — 87491 CHLMYD TRACH DNA AMP PROBE: CPT | Performed by: OBSTETRICS & GYNECOLOGY

## 2022-12-20 PROCEDURE — 86803 HEPATITIS C AB TEST: CPT | Performed by: OBSTETRICS & GYNECOLOGY

## 2022-12-20 PROCEDURE — 86900 BLOOD TYPING SEROLOGIC ABO: CPT | Performed by: OBSTETRICS & GYNECOLOGY

## 2022-12-20 PROCEDURE — 81001 URINALYSIS AUTO W/SCOPE: CPT | Performed by: OBSTETRICS & GYNECOLOGY

## 2022-12-20 PROCEDURE — 86901 BLOOD TYPING SEROLOGIC RH(D): CPT | Performed by: OBSTETRICS & GYNECOLOGY

## 2022-12-20 PROCEDURE — 99207 PR FIRST OB VISIT: CPT | Performed by: OBSTETRICS & GYNECOLOGY

## 2022-12-20 PROCEDURE — 86850 RBC ANTIBODY SCREEN: CPT | Performed by: OBSTETRICS & GYNECOLOGY

## 2022-12-20 PROCEDURE — 36415 COLL VENOUS BLD VENIPUNCTURE: CPT | Performed by: OBSTETRICS & GYNECOLOGY

## 2022-12-20 PROCEDURE — 86780 TREPONEMA PALLIDUM: CPT | Performed by: OBSTETRICS & GYNECOLOGY

## 2022-12-20 PROCEDURE — 87340 HEPATITIS B SURFACE AG IA: CPT | Performed by: OBSTETRICS & GYNECOLOGY

## 2022-12-20 PROCEDURE — 87591 N.GONORRHOEAE DNA AMP PROB: CPT | Performed by: OBSTETRICS & GYNECOLOGY

## 2022-12-20 PROCEDURE — 85027 COMPLETE CBC AUTOMATED: CPT | Performed by: OBSTETRICS & GYNECOLOGY

## 2022-12-20 NOTE — PROGRESS NOTES
Chelo is a 27 year old  @  10w0d weeks here for new ob visit.      See Ob questionnaire for pertinent components of HPI.  Current Issues include: nausea, mild    OBhx: never pregnant  Gyne: Pap smears Normal  Past Medical History:   Diagnosis Date     Scoliosis      History reviewed. No pertinent surgical history.  Patient Active Problem List    Diagnosis Date Noted     Need for Tdap vaccination 2022     Priority: Medium     Intermittent asthma      Priority: Medium     Created by Conversion         Scoliosis 2015     Priority: Medium     Lower back pain 2015     Priority: Medium     Eczema      Priority: Medium     Created by Conversion            Allergies   Allergen Reactions     Latex      Current Outpatient Medications   Medication Sig Dispense Refill     albuterol (PROAIR HFA/PROVENTIL HFA/VENTOLIN HFA) 108 (90 Base) MCG/ACT inhaler Inhale 2 puffs into the lungs every 4 hours as needed for shortness of breath / dyspnea or wheezing 18 g 11     Prenatal Vit-Fe Fumarate-FA (PRENATAL MULTIVITAMIN W/IRON) 27-0.8 MG tablet Take 1 tablet by mouth daily         Past Medical History of Father of Baby:   No significant medical history    Physical Exam: /60 (BP Location: Right arm, Patient Position: Sitting, Cuff Size: Adult Regular)   Pulse 77   Wt 69.9 kg (154 lb)   LMP 10/11/2022   BMI 23.76 kg/m    General: Well developed, well nourished female  Skin: Normal  HEENT: Normal  Neck: Supple,no adenopathy,thyroid normal  Chest: Clear  Heart: Regular rate, rhythm,No murmur, rub, gallop  Breasts: No masses, skin, nipple or axillary changes and Not examined   Abdomen: Benign,Soft, flat, non-tender,No masses, organomegaly,No inguinal nodes,Bowel sounds normoactive   Extremities: Normal  Neurological: Normal   Perineum: Intact   Vulva: Normal  Vagina: Normal mucosa, no discharge  Cervix: Nulliparous, closed, mobile,  no discharge  Uterus: 10 weeks, Normal shape, position and consistency    Adnexa: Normal  Rectum: deferred, Normal without lesion or mass   Bony Pelvis: Adequate   Nurse for exam    A/P 27 year old  at  10w0d weeks    ICD-10-CM    1. Supervision of high risk pregnancy in first trimester  O09.91 Neisseria gonorrhoeae PCR     Chlamydia trachomatis PCR     Pap imaged thin layer screen reflex to HPV if ASCUS - recommend age 25 - 29     Mat Fetal [facility] Referral - Pregnancy      2. Supervision of normal first pregnancy in first trimester  Z34.00 Hepatitis C antibody     UA reflex to Microscopic     Urine Culture Aerobic Bacterial     Treponema Abs w Reflex to RPR and Titer     Rubella Antibody IgG     HIV Antigen Antibody Combo     CBC with platelets     Hepatitis B surface antigen     ABO/Rh type and screen          - Discussed physician coverage, tertiary support, diet, exercise, weight gain, schedule of visits, routine and indicated ultrasounds, and childbirth education.    - Options for  testing for chromosomal and birth defects were discussed with the patient.  Diagnostic tests include CVS and Amniocentesis.  We discussed that these tests are definitive but invasive and do carry a risk of fetal loss.    Screening tests include nuchal translucency/blood marker testing in the first trimester and quad screening in the second trimester.  We discussed that these are screening tests and not diagnostic tests and that false positives and negatives are a distinct possibility.  The patient wants first rimester testing.        return to clinic in 4 weeks.

## 2022-12-21 DIAGNOSIS — M41.9 SCOLIOSIS: Primary | ICD-10-CM

## 2022-12-21 DIAGNOSIS — J45.20 INTERMITTENT ASTHMA: ICD-10-CM

## 2022-12-21 LAB
BACTERIA UR CULT: NORMAL
C TRACH DNA SPEC QL NAA+PROBE: NEGATIVE
N GONORRHOEA DNA SPEC QL NAA+PROBE: NEGATIVE
T PALLIDUM AB SER QL: NONREACTIVE

## 2022-12-22 LAB
BKR LAB AP GYN ADEQUACY: NORMAL
BKR LAB AP GYN INTERPRETATION: NORMAL
BKR LAB AP HPV REFLEX: NORMAL
BKR LAB AP PREVIOUS ABNORMAL: NORMAL
PATH REPORT.COMMENTS IMP SPEC: NORMAL
PATH REPORT.COMMENTS IMP SPEC: NORMAL
PATH REPORT.RELEVANT HX SPEC: NORMAL
RUBV IGG SERPL QL IA: 2.07 INDEX
RUBV IGG SERPL QL IA: POSITIVE

## 2022-12-29 ENCOUNTER — PRE VISIT (OUTPATIENT)
Dept: MATERNAL FETAL MEDICINE | Facility: HOSPITAL | Age: 27
End: 2022-12-29

## 2023-01-13 ENCOUNTER — ANCILLARY PROCEDURE (OUTPATIENT)
Dept: ULTRASOUND IMAGING | Facility: HOSPITAL | Age: 28
End: 2023-01-13
Attending: OBSTETRICS & GYNECOLOGY
Payer: COMMERCIAL

## 2023-01-13 ENCOUNTER — OFFICE VISIT (OUTPATIENT)
Dept: MATERNAL FETAL MEDICINE | Facility: HOSPITAL | Age: 28
End: 2023-01-13
Attending: OBSTETRICS & GYNECOLOGY
Payer: COMMERCIAL

## 2023-01-13 ENCOUNTER — LAB (OUTPATIENT)
Dept: LAB | Facility: HOSPITAL | Age: 28
End: 2023-01-13
Attending: OBSTETRICS & GYNECOLOGY
Payer: COMMERCIAL

## 2023-01-13 DIAGNOSIS — Z36.9 UNSPECIFIED ANTENATAL SCREENING: Primary | ICD-10-CM

## 2023-01-13 DIAGNOSIS — O26.90 PREGNANCY RELATED CONDITION, ANTEPARTUM: ICD-10-CM

## 2023-01-13 DIAGNOSIS — Z36.9 UNSPECIFIED ANTENATAL SCREENING: ICD-10-CM

## 2023-01-13 DIAGNOSIS — Z36.82 NUCHAL TRANSLUCENCY OF FETUS ON PRENATAL ULTRASOUND: Primary | ICD-10-CM

## 2023-01-13 PROCEDURE — 76813 OB US NUCHAL MEAS 1 GEST: CPT | Mod: 26 | Performed by: OBSTETRICS & GYNECOLOGY

## 2023-01-13 PROCEDURE — 36415 COLL VENOUS BLD VENIPUNCTURE: CPT

## 2023-01-13 PROCEDURE — 99207 PR NO CHARGE LOS: CPT | Performed by: OBSTETRICS & GYNECOLOGY

## 2023-01-13 PROCEDURE — 96040 HC GENETIC COUNSELING, EACH 30 MINUTES: CPT | Performed by: GENETIC COUNSELOR, MS

## 2023-01-13 PROCEDURE — 76813 OB US NUCHAL MEAS 1 GEST: CPT

## 2023-01-13 NOTE — PROGRESS NOTES
"Please see \"Imaging\" tab under Chart Review for full details.    Michelle Ernst MD  Maternal Fetal Medicine    "

## 2023-01-13 NOTE — NURSING NOTE
Patient education provided to patient on genetic testing options via GC and the extent of nuchal translucency ultrasound.  SBAR given to ANGELINA BARROSO, see their note in Epic.

## 2023-01-13 NOTE — PROGRESS NOTES
[facility]  Genetic Counseling Consult    Patient:  Chelo Faulkner YOB: 1995   Date of Service:  1/13/23   MRN: [id number]    Chelo was seen at the [facility] for genetic consultation. The indication for genetic counseling is desire to discuss options for genetic screening and diagnostics. The patient was accompanied to this visit by their partner Elías. The patient is wearing a mask due to current [facility] policies.     The session was conducted in English.      IMPRESSION/ PLAN   1. Chelo has not had genetic screening in this pregnancy but elected to have screening today.     2. During today's [facility] visit, Chelo had a blood draw for NIPS through [facility]. The NIPS screens for trisomy 21, 18, and 13 and the patient opted to screen for sex chromosome aneuploidies, including reported fetal sex. Results are expected in 7-10 days. The patient will be called with results and if they do not answer they requested a vague message with callback information. Patient was informed that results, including fetal sex, will be available in [employer]. Chelo plans to have predicted fetal sex disclosed separately from test results and plans for this will be finalized while discussing test result.    3. Chelo had a nuchal translucency ultrasound today. Please see the ultrasound report for further details.    4. Further recommendations include a fetal anatomy ultrasound around 18-20 weeks, which will most likely be completed with their primary OB provider, and maternal serum AFP only screening for neural tube defects, if desired (quad screen should NOT be performed).     5. Chelo and Elías are interested in expanded carrier screening but would like to discuss further before proceeding.  They were provided direct contact information for genetic counseling and were encouraged to reach out to discuss and/or coordinate testing as  "desired.    PREGNANCY HISTORY   /Parity:         CURRENT PREGNANCY   Current Age: 27 year old   Age at Delivery: 28 year old  CHARLOTTE: 2023, by Last Menstrual Period                                   Gestational Age: 13w3d  This pregnancy is a single gestation.   This pregnancy was conceived spontaneously.    MEDICAL HISTORY   Chelo s reported medical history is not expected to impact pregnancy management or risks to fetal development.       FAMILY HISTORY   A three-generation pedigree was obtained today and is scanned under the \"Media\" tab in Epic. The family history was reported by Chelo and their partner.    The following significant findings were reported today:     Chelo's partner Elías reports his father has hereditary hemochromatosis.  Per report, his sister is also homozygous but asymptomatic. Elías has not been tested personally but understands he is at risk for the condition.  We discussed that hereditary hemochromatosis, most commonly due to mutations in the HFE gene, is a disorder in which increased intestinal iron absorption can lead to total-body iron overload; it is among the most common genetic disorders in the world with a carrier frequency in the  population of 1/9. Importantly, the majority of individuals with biallelic pathogenic variants in the HFE gene do not develop symptoms and this condition is considered to have low clinical penetrance. Prenatal testing is not usually performed because HFE hemochromatosis is an adult-onset, treatable disorder with low clinical penetrance. Molecular genetic testing is most useful to confirm a diagnosis in an individual with clinical symptoms, however it is available for at risk individuals and most informative when the familial pathogenic variants have been idetnified. We discussed the benefits and limitations to this carrier testing option including clinical utility.    Elías has a nephew with unexplained childhood seizures.  Per report " his seizures are mild but frequent and his physicians expect that he will grow out of them as he gets older.  Everette' father also has unexplained seizures.  We discussed that there is a wide range of possible causes for seizure disorders, including infection, illness, exposure, injury, congenital brain anomalies, and genetic conditions.  Without knowing a precise cause for the family members' seizures, an exact risk estimate is not possible.     Otherwise, the reported family history is unremarkable for multiple miscarriages, stillbirths, birth defects, intellectual disabilities, known genetic conditions, and consanguinity.       CARRIER SCREENING   Expanded carrier screening is available to screen for autosomal recessive conditions and X-linked conditions in a large list of genes. Autosomal recessive conditions happen when a mutation has been inherited from the egg and sperm and include conditions like cystic fibrosis, thalassemia, hearing loss, spinal muscular atrophy, and more. X-linked conditions happen when a mutation has been inherited from the egg and include conditions like fragile X syndrome.  screening was also reviewed. About [place] Screening        The patient was not certain about whether to pursue carrier screening today. They will contact us if they would like to pursue screening. We discussed the following:     Carrier screening does not test the pregnancy but gives a risk assessment for the pregnancy and future pregnancies to have the condition    There are different size panels or list of conditions for carrier screening.Some conditions cause health problems for carriers    Carrier screening does not test for all genetic and health conditions or risk factors    There are limitations to current technology and results may be updated at a later date    The results typically take 2-3 weeks. They will be available in EPIC and routed to the referring OB provider. The patient can view them in  [facility] and the lab's patient portal.    The patient's partner will be recommended to have carrier screening at the same time if the opt to proceed with testing    If an individual is a carrier, family members could be as well. The patient is encouraged to share this information with relatives.    The lab will communicate the out-of-pocket cost with the patient and will also provide an option to switch to self-pay. The default is billing through insurance, and it is the patient's responsibility to respond to the communication if they would like to switch to self-pay.         RISK ASSESSMENT FOR CHROMOSOME CONDITIONS   We explained that the risk for fetal chromosome abnormalities increases with maternal age. We discussed specific features of common chromosome abnormalities, including Down syndrome, trisomy 13, trisomy 18, and sex chromosome trisomies.      At age 27 at midtrimester, the risk to have a baby with Down syndrome is 1 in 855.    At age 27 at midtrimester, the risk to have a baby with any chromosome abnormality is 1 in 428.     Chelo has not had genetic screening in this pregnancy but elected to have screening today.      GENETIC TESTING OPTIONS   Genetic testing during a pregnancy includes screening and diagnostic procedures.      Screening tests are non-invasive which means no risk to the pregnancy and includes ultrasounds and blood work. The benefits and limitations of screening were reviewed. Screening tests provide a risk assessment (chance) specific to the pregnancy for certain fetal chromosome abnormalities but cannot definitively diagnose or exclude a fetal chromosome abnormality. Follow-up genetic counseling and consideration of diagnostic testing is recommended with any abnormal screening result. Diagnostic testing during a pregnancy is more certain and can test for more conditions. However, the tests do have a risk of miscarriage that requires careful consideration. These tests can detect fetal  chromosome abnormalities with greater than 99% certainty. Results can be compromised by maternal cell contamination or mosaicism and are limited by the resolution of current genetic testing technology.     There is no screening or diagnostic test that detects all forms of birth defects or intellectual disability.     We discussed the following screening options:   First trimester screening    Ultrasound between 60o8q-87o9x that includes nuchal translucency measurement and nasal bone assessments    Nuchal translucency refers to the space at the back of the neck where fluid builds up. All babies at this stage have fluid and there is only concern if there is too much fluid    Nasal bone refers to the small bone in the nose. There is concern for conditions like Down syndrome if the bone cannot be seen at all    Blood work from arm for levels of hCG and VIOLA-A    Screens for trisomy 21 and trisomy 18    Cannot screen for open neural tube defects, maternal serum AFP after 15 weeks is recommended    Non-invasive prenatal testing (NIPT)    Also called cell-free DNA screening because it detects chromosomes from the placenta in the pregnant person's blood    Can be done any time after 10 weeks gestation    Screens for trisomy 21, trisomy 18, trisomy 13, and sex chromosome aneuploidies    Cannot screen for open neural tube defects, maternal serum AFP after 15 weeks is recommended      We discussed the following ultrasound options:  Nuchal translucency (NT) ultrasound    Ultrasound between [id number] that includes nuchal translucency measurement and nasal bone assessments    Nuchal translucency refers to the space at the back of the neck where fluid builds up. All babies at this stage have fluid and there is only concern if there is too much fluid    Nasal bone refers to the small bone in the nose. There is concern for conditions like Down syndrome if the bone cannot be seen at all    This ultrasound can be done as part of  first trimester screening, at the same time as another screen (NIPT), at the same time as a CVS, or if the patients does not want genetic screening.    Markers on ultrasound detects about 70% of pregnancies with aneuploidy    Abnormalities on NT ultrasound can also increase the risk for a birth defect, like a heart defect    Comprehensive level II ultrasound (Fetal Anatomy Ultrasound)    Ultrasound done between 18-20 weeks gestation    Screens for major birth defects and markers for aneuploidy (like trisomy 21 and trisomy 18)    Includes looking at the fetus/baby's growth, heart, organs (stomach, kidneys), placenta, and amniotic fluid      We discussed the following diagnostic options:   Amniocentesis    Invasive diagnostic procedure done after 15 weeks gestation    The procedure collects a small sample of amniotic fluid for the purpose of chromosomal testing and/or other genetic testing    Diagnostic result; more than 99% sensitivity for fetal chromosome abnormalities    Testing for AFP in the amniotic fluid can test for open neural tube defects        It was a pleasure to be involved with Chelo s care. Face-to-face time of the meeting was 45 minutes.    Kem Mccracken, DONNIE, MS, [facility]  Certified and [place] Licensed Genetic Counselor  [facility]  Maternal Fetal Medicine  Office: [phone]  [facility]: [phone]   Fax: [phone]  [facility]

## 2023-01-17 ENCOUNTER — PRENATAL OFFICE VISIT (OUTPATIENT)
Dept: OBGYN | Facility: CLINIC | Age: 28
End: 2023-01-17
Payer: COMMERCIAL

## 2023-01-17 VITALS
SYSTOLIC BLOOD PRESSURE: 110 MMHG | BODY MASS INDEX: 23.98 KG/M2 | OXYGEN SATURATION: 98 % | HEART RATE: 80 BPM | DIASTOLIC BLOOD PRESSURE: 68 MMHG | WEIGHT: 155.4 LBS

## 2023-01-17 DIAGNOSIS — N89.8 VAGINAL DISCHARGE: ICD-10-CM

## 2023-01-17 DIAGNOSIS — O09.91 SUPERVISION OF HIGH RISK PREGNANCY IN FIRST TRIMESTER: Primary | ICD-10-CM

## 2023-01-17 PROCEDURE — 99207 PR PRENATAL VISIT: CPT | Performed by: OBSTETRICS & GYNECOLOGY

## 2023-01-17 PROCEDURE — 87210 SMEAR WET MOUNT SALINE/INK: CPT | Performed by: OBSTETRICS & GYNECOLOGY

## 2023-01-17 NOTE — PROGRESS NOTES
14w0d Eating well.  Complains of vaginal discharge.Declined/requesting quad screen. return to clinic in 4 weeks.  Results for orders placed or performed in visit on 01/17/23   Wet prep - Clinic Collect     Status: Abnormal    Specimen: Vagina; Swab   Result Value Ref Range    Trichomonas Absent Absent    Yeast Absent Absent    Clue Cells Absent Absent    WBCs/high power field 4+ (A) None       ICD-10-CM    1. Supervision of high risk pregnancy in first trimester  O09.91 Wet prep - Clinic Collect      2. Vaginal discharge  N89.8 Wet prep - Clinic Collect        CEPHAS AGBEH, MD.

## 2023-01-17 NOTE — PATIENT INSTRUCTIONS
To Schedule an Appointment 24/7  Call: [phone]    If you have any questions regarding your visit, Please contact your care team.  [facility]: [phone]  [facility] HOURS TELEPHONE NUMBER   Cephas Agbeh, M.D.      Moy Ball-Surgery Scheduler  Susi-Surgery Scheduler         Monday - [place]:    8:00 am-4:45 pm  Tuesday - [place]:   8:00 am-4:45 pm  Friday-[place]:       8:00 am-4:45 pm  Typical Surgery Day:  Wednesday [facility]   [address]   [place] [address] [phone]   Fax [phone]    Imaging Scheduling all locations  [phone]      [facility] [place] [facility] Labor and Delivery   [address] [place] [address] [address].   [place], [place] [address] [phone]    [facility]  [address] [facility] [address]  [phone]  Fax [phone]     Urgent Care locations:  [facility] Monday-Friday                               10 am - 8 pm  Saturday and Sunday                      9 am - 5 pm  Monday-Friday                              10 am- 8 pm  Saturday and Sunday                      9 am - 5 pm    [phone]   **Surgeries** Our Surgery Schedulers will contact you to schedule. If you do not receive a call within 3 business days, please call [phone].  If you need a medication refill, please contact your pharmacy. Please allow 3 business days for your refill to be completed.  As always, Thank you for trusting us with your healthcare needs!  see additional instructions from your care team below

## 2023-01-23 ENCOUNTER — TELEPHONE (OUTPATIENT)
Dept: MATERNAL FETAL MEDICINE | Facility: CLINIC | Age: 28
End: 2023-01-23
Payer: COMMERCIAL

## 2023-01-23 LAB — SCANNED LAB RESULT: NORMAL

## 2023-01-23 NOTE — TELEPHONE ENCOUNTER
January 23, 2023    I spoke with Chelo regarding her NIPT results.     [facility] NIPS screening results indicate NO ANEUPLOIDY DETECTED for chromosomes 21, 18, 13, or sex chromosomes (XX). Per the patient's request during her initial genetic counseling consult, the predicted genetic sex of the pregnancy was not shared over the phone today. She is aware that fetal sex is on her test report that will populate in [employer].    These results put the patient's current pregnancy at low risk for Down syndrome, trisomy 18, trisomy 13 and sex chromosome abnormalities.This test is reported to have the following sensitivities: Down syndrome- 99.99%, trisomy 18- 99.99%, trisomy 13- 99.99%, XX sex chromosomes- 98.33%, XY sex chromosomes- 99.99%.  Although these results are reassuring, this does not replace a standard chromosome analysis from a chorionic villus sampling or amniocentesis. The patient has declined proceeding with diagnostic invasive prenatal testing at this time.    MSAFP is the appropriate second trimester screening test for open neural tube defects; the maternal quad screen is not recommended. Her results are available in her Epic chart for review and will be forwarded to her primary OB.     Lastly, the patient expressed interest in undergoing expanded carrier screening in the current pregnancy. She shared that she and her partner have availability this upcoming Thursday morning (1/26) and Friday (1/27) to undergo blood work for screening. Patient was agreeable to [facility] genetic counseling reviewing the schedule and calling the patient with more details about best next steps for scheduling.       Lalita Salinas MS, [facility]  Genetic Counselor  [facility]  Maternal Fetal Medicine  Ph: [phone]

## 2023-01-25 ENCOUNTER — TELEPHONE (OUTPATIENT)
Dept: MATERNAL FETAL MEDICINE | Facility: CLINIC | Age: 28
End: 2023-01-25
Payer: COMMERCIAL

## 2023-01-25 NOTE — TELEPHONE ENCOUNTER
1/25/2023    Called Chelo to follow up on care for carrier screening.  hCelo and Elías would like to have carrier screening done, but are currently in the middle of moving and will not have availability this week.  We made a plan for genetic counseling to contact Chelo at 8:15 Monday morning, 1/30, to finalize plans for testing.      Kem Mccracken MS, [facility]  Licensed Genetic Counselor  Phone: [phone]  Pager: [phone]

## 2023-01-30 ENCOUNTER — TELEPHONE (OUTPATIENT)
Dept: MATERNAL FETAL MEDICINE | Facility: CLINIC | Age: 28
End: 2023-01-30
Payer: COMMERCIAL

## 2023-01-30 ENCOUNTER — MEDICAL CORRESPONDENCE (OUTPATIENT)
Dept: HEALTH INFORMATION MANAGEMENT | Facility: CLINIC | Age: 28
End: 2023-01-30
Payer: COMMERCIAL

## 2023-01-30 DIAGNOSIS — Z31.430 ENCOUNTER OF FEMALE FOR TESTING FOR GENETIC DISEASE CARRIER STATUS FOR PROCREATIVE MANAGEMENT: Primary | ICD-10-CM

## 2023-01-30 NOTE — TELEPHONE ENCOUNTER
Impression/Plan:   1.  Chelo and her partner Mack have been seen previously in [facility] to discuss options for prenatal genetic screening and had a planned phone call with genetic counseling today to finalize carrier screening plans for the couple.    2.  Both Chelo and Mack were present for this call via speakerphone, and provided consent to communicate with each other regarding results as needed moving forward.    3.  Chelo completed consent for  carrier screening (115 conditions through [employer]). Chelo plans to have blood drawn the morning of 2/1/23. Results are expected within 10-21 days from the blood draw, and will be available in [employer].  We will contact her to discuss the results, and a copy will be forwarded to the office of the referring OB provider.        Carrier Screening:         We discussed carrier screening can be done before or during apregnancy. The purpose of carrier screening is providing an individual or couple with a personalized risk assessment for genetic conditions. This is accomplished by screening an individual to determine if they are a carrierfor a genetic condition. For most conditions, both parents must be a carrier of the condition for the pregnancy to be at an increased risk. For other conditions that are X-linked, there is an increased risk when a female(mother) is a carrier and the concerns are greater if she passes that condition to a son.     Carrier screening is an option and a personal decision to pursue. If an individual or couple is interested or wishes to pursue carrier screening the following is discussed:    For most genetic conditions, carriers are healthy individuals. For autosomal recessive conditions (ex cystic fibrosis, sicklecell anemia, etc), individuals have two copies of each gene. Carrier individuals have a mutation in one copy. For X-linked conditions, females have two copies of each gene and are considered carriers if one copy has amutation. Males only  have one copy of an X-linked gene, therefore, if that one copy has a mutation they are affected by the condition, rather than only being a carrier      For select conditions, carriers can have symptoms, however, the chance is variable. Examples of these conditions include:  o Femalepremutation carriers of fragile X syndrome can have symptoms in adulthood including premature ovarian failure and ataxia. If a female passes the mutation to a son they are at a high risk for fragile X syndrome, which is themost common genetic cause for autism spectrum disorder  o Female or male carriers of Gaucher disease can have an increased chance for developing Parkinson's disease. Gaucher disease is a severe metabolic disorder.       If both parents are carriers of an autosomal recessive condition, there are three possible outcomes for each pregnancy/child: 25% unaffected, 50% carrier, and 25%affected. The only method to determine the outcome or diagnosis the condition in a pregnancy is an invasive testing option such as an amniocentesis. Some genetic conditions will have ultrasound findings during the pregnancybut many do not. Some couples will choose the diagnostic testing to plan for delivery or choose termination of an affected pregnancy. Other couples will choose to test for the condition after delivery. While these conditionscannot be cured or treated during the pregnancy it may guide management recommendations (ultrasounds) for pregnancy as well as delivery and infant care.     Chelo and Elías wished to pursue carrier screening. The following was discussed as part of informed consent in addition to the above information:    We discussed [employer] carrier screening which is offered with several different panels. The couple choose the society supported panel of 115 conditions.       Carrier screening is not meant to diagnose the patient with a condition, and generally carriers are asymptomatic. However, certain genes may confer  increased risks for various health concerns in carriers such as fragile X syndrome, Duchene muscular dystrophy, and Gaucher disease among others.       We discussed that expanded carrier screening is designed to identify carrier status for conditions that are primarily childhood or adolescent onset. Expanded carrier screening does not evaluate for adult-onset conditions such as hereditary cancer syndromes, dementia/ Alzheimer's disease, or cardiovascular disease risk factors. Additionally, expanded carrier screening is not comprehensive for all known genetic diseases or inherited conditions. This is a screening test, and residual carrier status risk figures will be provided to the patient after results become available.  We discussed there are limitations such as current technology and rare chance of a false positive or negative.       [facility] laboratory will report on pseudodeficiency alleles, which are benign variants that are not known to be associated with disease and are not thought to impact the individuals risk to be a carrier for these conditions. However, the presence of pseudodeficiency alleles can exhibit false positive results on biochemical. Therefore, disclosing this information to patients may aid in interpretation of a  screen flag.       Results are typically available in 10-21 days. We will call them with the results and the report will eventually be available via [facility]'s patient portal.       Recommendations will be made for partner testing, if the patient is found to be a carrier for any autosomal recessive conditions. MFM will facilitate in screening for the partner.       There are implications for family members. If an individual is a carrier of a condition there is a chance for relatives to also be a carrier. This may be helpful information to disclose to family (siblings, cousins) so they may choose if they want to pursue carriers screening. In addition, if only one parent is found  to be a carrier, there is a 50% chance for each child to be a carrier. This may be helpful information for the patient's children when they start a family.      [employer] will contact the patient via text, email, or both with cost estimate information. The communication will list the cost billed through insurance and provide an option for self-pay. The default is insurance billing so patients must choose the self pay option and follow through with credit card information if desired. The self pay cost of NIPT is $99, carrier screening is $250 with partner carrier screening for $100. The patient has the responsibility to determine if insurance or self pay is a better financial decision.    Kem Mccracken MS, [facility]  Licensed Genetic Counselor  Phone: [phone]  Pager: [phone]

## 2023-02-01 ENCOUNTER — LAB (OUTPATIENT)
Dept: LAB | Facility: HOSPITAL | Age: 28
End: 2023-02-01
Payer: COMMERCIAL

## 2023-02-01 DIAGNOSIS — Z31.430 ENCOUNTER OF FEMALE FOR TESTING FOR GENETIC DISEASE CARRIER STATUS FOR PROCREATIVE MANAGEMENT: ICD-10-CM

## 2023-02-01 PROCEDURE — 36415 COLL VENOUS BLD VENIPUNCTURE: CPT

## 2023-02-13 ENCOUNTER — TELEPHONE (OUTPATIENT)
Dept: MATERNAL FETAL MEDICINE | Facility: CLINIC | Age: 28
End: 2023-02-13
Payer: COMMERCIAL

## 2023-02-13 LAB — SCANNED LAB RESULT: NORMAL

## 2023-02-13 NOTE — TELEPHONE ENCOUNTER
2/13/2023    Called Chelo to discuss carrier screening results.  Chelo's results are negative for all 115 conditions assessed.  Her partner Mack's results where also negative for all conditions, and we reviewed that their combined results put the pregnancy at extremely reduced probability for these conditions.  Chelo had no questions and was encouraged to remain in contact as needed moving forward.     Kem Mccracken MS, [facility]  Licensed Genetic Counselor  Phone: [phone]  Pager: [phone]

## 2023-02-14 ENCOUNTER — PRENATAL OFFICE VISIT (OUTPATIENT)
Dept: OBGYN | Facility: CLINIC | Age: 28
End: 2023-02-14
Payer: COMMERCIAL

## 2023-02-14 VITALS
BODY MASS INDEX: 24.72 KG/M2 | SYSTOLIC BLOOD PRESSURE: 108 MMHG | WEIGHT: 160.2 LBS | OXYGEN SATURATION: 98 % | DIASTOLIC BLOOD PRESSURE: 67 MMHG | HEART RATE: 84 BPM

## 2023-02-14 DIAGNOSIS — O09.91 SUPERVISION OF HIGH RISK PREGNANCY IN FIRST TRIMESTER: Primary | ICD-10-CM

## 2023-02-14 PROCEDURE — 99207 PR PRENATAL VISIT: CPT | Performed by: OBSTETRICS & GYNECOLOGY

## 2023-02-14 NOTE — PATIENT INSTRUCTIONS
To Schedule an Appointment 24/7  Call: [phone]    If you have any questions regarding your visit, Please contact your care team.  [facility]: [phone]  [facility] HOURS TELEPHONE NUMBER   Cephas Agbeh, M.D.      Moy Ball-Surgery Scheduler  Susi-Surgery Scheduler         Monday - [place]:    8:00 am-4:45 pm  Tuesday - [place]:   8:00 am-4:45 pm  Friday-[place]:       8:00 am-4:45 pm  Typical Surgery Day:  Wednesday [facility]   [address]   [place] [address] [phone]   Fax [phone]    Imaging Scheduling all locations  [phone]      [facility] Labor and Delivery   [address] [place] [address] Dr.   [place], [place] [address] [phone]    [facility]  [address] [facility] [address]  [phone]  Fax [phone]     Urgent Care locations:  [facility] Monday-Friday                               10 am - 8 pm  Saturday and Sunday                      9 am - 5 pm  Monday-Friday                              10 am- 8 pm  Saturday and Sunday                      9 am - 5 pm    [phone]   **Surgeries** Our Surgery Schedulers will contact you to schedule. If you do not receive a call within 3 business days, please call [phone].  If you need a medication refill, please contact your pharmacy. Please allow 3 business days for your refill to be completed.  As always, Thank you for trusting us with your healthcare needs!  see additional instructions from your care team below

## 2023-02-14 NOTE — PROGRESS NOTES
17w0d . Eating well.  No nausea or vomiting. No headaches or changes in vision. She is experiencing occasional cramping around 1-2 times per week but is not having any associated bleeding. She had [employer] prenatal and carrier screening that was all negative. Had 13 wk US with MFM, which was normal. Discussed  MSAFP screening but patient declined at this time. We also reviewed workplace recommendations including hazard exposures and lifting recommendations. FHR 150s and fundal height 18 cm. Plan for 20 wk US. RTC @ 20 wk.    Goldie Baron, MS3  [employer] .  ----- Services Performed and Documented by a Medical Student in Presence of ATTENDING Physician-------    ICD-10-CM    1. Supervision of high risk pregnancy in second, trimester  O09.91 US OB > 14 Weeks        CEPHAS AGBEH, MD.

## 2023-02-24 ENCOUNTER — ANCILLARY PROCEDURE (OUTPATIENT)
Dept: ULTRASOUND IMAGING | Facility: CLINIC | Age: 28
End: 2023-02-24
Attending: OBSTETRICS & GYNECOLOGY
Payer: COMMERCIAL

## 2023-02-24 DIAGNOSIS — O09.91 SUPERVISION OF HIGH RISK PREGNANCY IN FIRST TRIMESTER: ICD-10-CM

## 2023-02-24 PROCEDURE — 76805 OB US >/= 14 WKS SNGL FETUS: CPT

## 2023-02-26 DIAGNOSIS — O09.91 SUPERVISION OF HIGH RISK PREGNANCY IN FIRST TRIMESTER: Primary | ICD-10-CM

## 2023-03-14 ENCOUNTER — PRENATAL OFFICE VISIT (OUTPATIENT)
Dept: OBGYN | Facility: CLINIC | Age: 28
End: 2023-03-14
Payer: COMMERCIAL

## 2023-03-14 VITALS
DIASTOLIC BLOOD PRESSURE: 67 MMHG | SYSTOLIC BLOOD PRESSURE: 110 MMHG | HEART RATE: 79 BPM | OXYGEN SATURATION: 99 % | WEIGHT: 163.9 LBS | BODY MASS INDEX: 25.29 KG/M2

## 2023-03-14 DIAGNOSIS — O09.91 SUPERVISION OF HIGH RISK PREGNANCY IN FIRST TRIMESTER: Primary | ICD-10-CM

## 2023-03-14 PROCEDURE — 99207 PR PRENATAL VISIT: CPT | Performed by: OBSTETRICS & GYNECOLOGY

## 2023-03-14 NOTE — PATIENT INSTRUCTIONS
To Schedule an Appointment 24/7  Call: [phone]    If you have any questions regarding your visit, Please contact your care team.  [facility]: [phone]  [facility] HOURS TELEPHONE NUMBER   Cephas Agbeh, M.D.      [facility] Ball-Surgery Scheduler  Susi-Surgery Scheduler         Monday - [place]:    8:00 am-4:45 pm  Tuesday - [place]:   8:00 am-4:45 pm  Friday-[place]:       8:00 am-4:45 pm  Typical Surgery Day:  Wednesday [facility]   [address]   [place] [address] [phone]   Fax [phone]    Imaging Scheduling all locations  [phone]      [facility] Labor and Delivery   [address] [place] [address] Dr.   [place], [place] [address] [phone]    [facility]  [address] [facility] [address]  [phone]  Fax [phone]     Urgent Care locations:  [facility] Monday-Friday                               10 am - 8 pm  Saturday and Sunday                      9 am - 5 pm  Monday-Friday                              10 am- 8 pm  Saturday and Sunday                      9 am - 5 pm    [phone]   **Surgeries** Our Surgery Schedulers will contact you to schedule. If you do not receive a call within 3 business days, please call [phone].  If you need a medication refill, please contact your pharmacy. Please allow 3 business days for your refill to be completed.  As always, Thank you for trusting us with your healthcare needs!  see additional instructions from your care team below

## 2023-03-14 NOTE — PROGRESS NOTES
"22w0d  Doing well without issues/concerns.   Vital signs:      BP: 110/67 Pulse: 79     SpO2: 99 %       Weight: 74.3 kg (163 lb 14.4 oz)  Estimated body mass index is 25.29 kg/m  as calculated from the following:    Height as of 11/21/22: 1.715 m (5' 7.5\").    Weight as of this encounter: 74.3 kg (163 lb 14.4 oz).   Routine anticipatory guidance. F/U  US is ordered.  RTC 4wk.   Plan for  GCT, Hgb. return to clinic in 4 weeks.      ICD-10-CM    1. Supervision of high risk pregnancy in first trimester  O09.91 US OB Follow Up >14 Weeks     Glucose tolerance gest screen 1 hour     ABO and Rh     Hemoglobin        CEPHAS AGBEH, MD.    "

## 2023-04-07 ENCOUNTER — ANCILLARY PROCEDURE (OUTPATIENT)
Dept: ULTRASOUND IMAGING | Facility: CLINIC | Age: 28
End: 2023-04-07
Attending: OBSTETRICS & GYNECOLOGY
Payer: COMMERCIAL

## 2023-04-07 DIAGNOSIS — O09.91 SUPERVISION OF HIGH RISK PREGNANCY IN FIRST TRIMESTER: ICD-10-CM

## 2023-04-07 PROCEDURE — 76816 OB US FOLLOW-UP PER FETUS: CPT | Performed by: RADIOLOGY

## 2023-04-11 ENCOUNTER — PRENATAL OFFICE VISIT (OUTPATIENT)
Dept: OBGYN | Facility: CLINIC | Age: 28
End: 2023-04-11
Payer: COMMERCIAL

## 2023-04-11 VITALS
BODY MASS INDEX: 26.42 KG/M2 | WEIGHT: 171.2 LBS | SYSTOLIC BLOOD PRESSURE: 123 MMHG | DIASTOLIC BLOOD PRESSURE: 76 MMHG | HEART RATE: 102 BPM | OXYGEN SATURATION: 97 %

## 2023-04-11 DIAGNOSIS — O09.91 SUPERVISION OF HIGH RISK PREGNANCY IN FIRST TRIMESTER: Primary | ICD-10-CM

## 2023-04-11 LAB
ABO/RH(D): NORMAL
GLUCOSE 1H P 50 G GLC PO SERPL-MCNC: 95 MG/DL (ref 70–129)
HGB BLD-MCNC: 11.2 G/DL (ref 11.7–15.7)
SPECIMEN EXPIRATION DATE: NORMAL

## 2023-04-11 PROCEDURE — 36415 COLL VENOUS BLD VENIPUNCTURE: CPT | Performed by: OBSTETRICS & GYNECOLOGY

## 2023-04-11 PROCEDURE — 99207 PR PRENATAL VISIT: CPT | Performed by: OBSTETRICS & GYNECOLOGY

## 2023-04-11 PROCEDURE — 86900 BLOOD TYPING SEROLOGIC ABO: CPT | Performed by: OBSTETRICS & GYNECOLOGY

## 2023-04-11 PROCEDURE — 59425 ANTEPARTUM CARE ONLY: CPT | Performed by: OBSTETRICS & GYNECOLOGY

## 2023-04-11 PROCEDURE — 82950 GLUCOSE TEST: CPT | Performed by: OBSTETRICS & GYNECOLOGY

## 2023-04-11 PROCEDURE — 86901 BLOOD TYPING SEROLOGIC RH(D): CPT | Performed by: OBSTETRICS & GYNECOLOGY

## 2023-04-11 PROCEDURE — 85018 HEMOGLOBIN: CPT | Performed by: OBSTETRICS & GYNECOLOGY

## 2023-04-11 NOTE — PROGRESS NOTES
26w0d  Doing well without issues/concerns.Mild swelling in left wrist and right ankles.  Routine anticipatory guidance.  US was normal.  RTC 4wk.  Glucola given. Plan for  GCT, Hgb    ICD-10-CM    1. Supervision of high risk pregnancy in first trimester  O09.91         CEPHAS AGBEH, MD.

## 2023-04-11 NOTE — PATIENT INSTRUCTIONS
To Schedule an Appointment 24/7  Call: [phone]    If you have any questions regarding your visit, Please contact your care team.  [facility]: [phone]  [facility] HOURS TELEPHONE NUMBER   Cephas Agbeh, M.D.      [facility] Ball-Surgery Scheduler  Susi-Surgery Scheduler         Monday - [place]:    8:00 am-4:45 pm  Tuesday - [place]:   8:00 am-4:45 pm  Friday-[place]:       8:00 am-4:45 pm  Typical Surgery Day:  Wednesday [facility]   [address]   [place] [address] [phone]   Fax [phone]    Imaging Scheduling all locations  [phone]      [facility] Labor and Delivery   [address] [place] [address] Dr.   [place], [place] [address] [phone]    [facility]  [address] [facility] [address]  [phone]  Fax [phone]     Urgent Care locations:  [facility] Monday-Friday                               10 am - 8 pm  Saturday and Sunday                      9 am - 5 pm  Monday-Friday                              10 am- 8 pm  Saturday and Sunday                      9 am - 5 pm    [phone]   **Surgeries** Our Surgery Schedulers will contact you to schedule. If you do not receive a call within 3 business days, please call [phone].  If you need a medication refill, please contact your pharmacy. Please allow 3 business days for your refill to be completed.  As always, Thank you for trusting us with your healthcare needs!  see additional instructions from your care team below

## 2023-06-04 ENCOUNTER — HEALTH MAINTENANCE LETTER (OUTPATIENT)
Age: 28
End: 2023-06-04

## 2024-07-21 ENCOUNTER — HEALTH MAINTENANCE LETTER (OUTPATIENT)
Age: 29
End: 2024-07-21

## 2025-08-10 ENCOUNTER — HEALTH MAINTENANCE LETTER (OUTPATIENT)
Age: 30
End: 2025-08-10